# Patient Record
Sex: MALE | Race: WHITE | Employment: FULL TIME | URBAN - METROPOLITAN AREA
[De-identification: names, ages, dates, MRNs, and addresses within clinical notes are randomized per-mention and may not be internally consistent; named-entity substitution may affect disease eponyms.]

---

## 2017-02-15 ENCOUNTER — CLINICAL SUPPORT (OUTPATIENT)
Dept: INTERNAL MEDICINE CLINIC | Age: 57
End: 2017-02-15

## 2017-02-15 DIAGNOSIS — Z11.1 PPD SCREENING TEST: Primary | ICD-10-CM

## 2017-02-15 NOTE — PROGRESS NOTES
Per Dr. Mindy Chu, patient presented in office today for TB injection given in left forearm. Patient tolerated well and understands he is to return to office for test to be read within 48 to 72 hours.

## 2017-02-17 ENCOUNTER — CLINICAL SUPPORT (OUTPATIENT)
Dept: INTERNAL MEDICINE CLINIC | Age: 57
End: 2017-02-17

## 2017-02-17 DIAGNOSIS — Z11.1 PPD SCREENING TEST: Primary | ICD-10-CM

## 2017-02-17 LAB
MM INDURATION POC: 0 MM (ref 0–5)
PPD POC: NORMAL NEGATIVE

## 2017-02-20 NOTE — PROGRESS NOTES
Patient presented in the office today, 2/17/17 to have his PPD read in his left forearm. Results 0mm. Patient left office ambulatory.

## 2017-03-20 ENCOUNTER — OFFICE VISIT (OUTPATIENT)
Dept: INTERNAL MEDICINE CLINIC | Age: 57
End: 2017-03-20

## 2017-03-20 VITALS
HEIGHT: 69 IN | TEMPERATURE: 98.8 F | DIASTOLIC BLOOD PRESSURE: 80 MMHG | SYSTOLIC BLOOD PRESSURE: 150 MMHG | HEART RATE: 72 BPM | OXYGEN SATURATION: 98 % | WEIGHT: 200 LBS | RESPIRATION RATE: 16 BRPM | BODY MASS INDEX: 29.62 KG/M2

## 2017-03-20 DIAGNOSIS — S82.891A FRACTURE OF RIGHT ANKLE, CLOSED, INITIAL ENCOUNTER: Primary | ICD-10-CM

## 2017-03-20 RX ORDER — HYDROCODONE BITARTRATE AND ACETAMINOPHEN 5; 325 MG/1; MG/1
TABLET ORAL
Refills: 0 | COMMUNITY
Start: 2017-03-17 | End: 2017-04-20 | Stop reason: SDUPTHER

## 2017-03-20 NOTE — PROGRESS NOTES
1. Have you been to the ER, urgent care clinic since your last visit? Hospitalized since your last visit? Yes When: Friday Where: [facility] Reason for visit: ankle injury    2. Have you seen or consulted any other health care providers outside of the [address] [place] [address] since your last visit? Include any pap smears or colon screening.  No

## 2017-03-20 NOTE — MR AVS SNAPSHOT
Visit Information Date & Time Provider Department Dept. Phone Encounter #  
 3/20/2017  4:15 PM Valeria Araya MD [facility] INTERNAL MEDICINE OF Sameer Jansen [phone] [id number] Your Appointments 12/27/2017  8:00 AM  
PROCEDURE with [facility] NONIMAGING  
[facility] ([facility]LAY[facility] SCHEDULING) Appt Note: leg art 1yr knaak [address] [address]  
[phone] [address] [facility] [place] [address]  
  
    
 12/27/2017  9:00 AM  
PROCEDURE with [facility] IMAGING 2  
[facility] ([facility] SCHEDULING) Appt Note: porfirio iliac stents 1yr kn[place] [address] [address]  
[phone] [address] [facility] [address]  
  
    
 12/27/2017 10:00 AM  
PROCEDURE with [facility] IMAGING 2  
[facility] ([facility] SCHEDULING) Appt Note: cv 1yr kn[place] [address] [address]  
[phone]  
  
    
 1/11/2018  9:00 AM  
Follow Up with SAMI Mason  
[facility] ([facility]LAYNE SCHEDULING) Appt Note: follow up after studies [address] [facility] [address] [address]  
[phone]  
  
   
 [address] [facility] [address] [address] Upcoming Health Maintenance Date Due Hepatitis C Screening 1960 Pneumococcal 19-64 Medium Risk (1 of 1 - PPSV23) 7/1/1977 FOBT Q 1 YEAR AGE 50-75 7/1/2008 DTaP/Tdap/Td series (1 - Tdap) 8/30/2008 INFLUENZA AGE 9 TO ADULT 8/1/2016 Allergies as of 3/20/2017  Review Complete On: 3/20/2017 By: Ruby Murphy LPN No Known Allergies Current Immunizations  Never Reviewed Name Date  
 TB Skin Test (PPD) Intradermal 2/15/2017 Td 8/29/2008 Not reviewed this visit Vitals BP Pulse Temp Resp Height(growth percentile) 150/80 (BP 1 Location: Right arm, BP Patient Position: Sitting) 72 98.8 °F (37.1 °C) (Tympanic) 16 5' 9\" (1.753 m) Weight(growth percentile) SpO2 BMI Smoking Status 200 lb (90.7 kg) 98% 29.53 kg/m2 Current Every Day Smoker Vitals History BMI and BSA Data Body Mass Index Body Surface Area  
 29.53 kg/m 2 2.1 m 2 Preferred Pharmacy Pharmacy Name Phone [address] [phone] Your Updated Medication List  
  
   
This list is accurate as of: 3/20/17  5:06 PM.  Always use your most recent med list. amLODIPine 5 mg tablet Commonly known as:  Angie Pace TAKE 1 TABLET DAILY  
  
 ascorbic acid (vitamin C) 500 mg tablet Commonly known as:  VITAMIN C Take 1,000 mg by mouth. clopidogrel 75 mg Tab Commonly known as:  PLAVIX TAKE 1 TABLET DAILY  
  
 * HYDROcodone-acetaminophen 7.5-325 mg per tablet Commonly known as:  NORCO  
1 tablet three times per day as needed for pain  
  
 * HYDROcodone-acetaminophen 5-325 mg per tablet Commonly known as:  Zoila Arts  
take 1 tablet by mouth every 4 hours AS NEEDED for pain  
  
 labetalol 200 mg tablet Commonly known as:  Drusilla Starring Take 1 Tab by mouth two (2) times a day. LIVALO 2 mg tablet Generic drug:  pitavastatin Take 2 mg by mouth daily. * Notice: This list has 2 medication(s) that are the same as other medications prescribed for you. Read the directions carefully, and ask your doctor or other care provider to review them with you. Patient Instructions Health Maintenance Due Topic Date Due  
 Hepatitis C Test  1960  Pneumococcal Vaccine (1 of 1 - PPSV23) 07/01/1977  Stool testing for trace blood  07/01/2008  DTaP/Tdap/Td  (1 - Tdap) 08/30/2008  Flu Vaccine  08/01/2016 Introducing [facility]! Tereza Otto introduces [employer] patient portal. Now you can access parts of your medical record, email your doctor's office, and request medication refills online.    
 
1. In your internet browser, go to [email or web address] 2. Click on the First Time User? Click Here link in the Sign In box. You will see the New Member Sign Up page. 3. Enter your [employer] Access Code exactly as it appears below. You will not need to use this code after youve completed the sign-up process. If you do not sign up before the expiration date, you must request a new code. · [employer] Access Code: [email or web address] Expires: 6/18/2017  5:06 PM 
 
4. Enter the last four digits of your Social Security Number (xxxx) and Date of Birth (mm/dd/yyyy) as indicated and click Submit. You will be taken to the next sign-up page. 5. Create a Areshayt ID. This will be your [employer] login ID and cannot be changed, so think of one that is secure and easy to remember. 6. Create a [employer] password. You can change your password at any time. 7. Enter your Password Reset Question and Answer. This can be used at a later time if you forget your password. 8. Enter your e-mail address. You will receive e-mail notification when new information is available in [address] 9. Click Sign Up. You can now view and download portions of your medical record. 10. Click the Download Summary menu link to download a portable copy of your medical information. If you have questions, please visit the Frequently Asked Questions section of the [employer] website. Remember, [employer] is NOT to be used for urgent needs. For medical emergencies, dial 911. Now available from your iPhone and Android! Please provide this summary of care documentation to your next provider. Your primary care clinician is listed as Sadia Barnes. If you have any questions after today's visit, please call [phone].

## 2017-03-21 NOTE — PROGRESS NOTES
The patient presents to the office today with the chief complaint of right ankle pain    HPI    The patient twisted his right ankle at home four days ago. Originally diagnosed as a sprained ankle when a review of the xrays revealing a minimally displaced distal fibular fracture. The patient is wearing a light splint. The patient complains of continued pain of his ankle with continued swelling of his right foot      Review of Systems   Musculoskeletal: Positive for joint pain (Right ankle pain). No Known Allergies    Current Outpatient Prescriptions   Medication Sig Dispense Refill    HYDROcodone-acetaminophen (NORCO) 5-325 mg per tablet take 1 tablet by mouth every 4 hours AS NEEDED for pain  0    amLODIPine (NORVASC) 5 mg tablet TAKE 1 TABLET DAILY 90 Tab 2    clopidogrel (PLAVIX) 75 mg tablet TAKE 1 TABLET DAILY 90 Tab 2    labetalol (NORMODYNE) 200 mg tablet Take 1 Tab by mouth two (2) times a day. 180 Tab 3    ascorbic acid, vitamin C, (VITAMIN C) 500 mg tablet Take 1,000 mg by mouth.  pitavastatin (LIVALO) 2 mg tablet Take 2 mg by mouth daily. Past Medical History:   Diagnosis Date    [facility] peripheral vascular [facility]     Hypertension     Peripheral vascular disease ([facility]     Pure hypercholesterolemia        Past Surgical History:   Procedure Laterality Date    HX ANGIOPLASTY Left 12/7/11    left leg    HX HEART CATHETERIZATION      HX KNEE ARTHROSCOPY  2/2008    HX OTHER SURGICAL  1982    jaw surgery       Social History     Social History    Marital status:      Spouse name: N/A    Number of children: N/A    Years of education: N/A     Occupational History    Not on file.      Social History Main Topics    Smoking status: Current Every Day Smoker     Packs/day: 2.00     Years: 20.00    Smokeless tobacco: Never Used    Alcohol use 10.5 oz/week     21 Cans of beer per week    Drug use: No    Sexual activity: No     Other Topics Concern    Not on file     Social History Narrative       Patient does not have an advanced directive on file    Visit Vitals    /80 (BP 1 Location: Right arm, BP Patient Position: Sitting)    Pulse 72    Temp 98.8 °F (37.1 °C) (Tympanic)    Resp 16    Ht 5' 9\" (1.753 m)    Wt 200 lb (90.7 kg)    SpO2 98%    BMI 29.53 kg/m2       Physical Exam   Musculoskeletal:        Feet:        Clinical Support on 02/15/2017   Component Date Value Ref Range Status    PPD 02/17/2017 neg  Negative Final    mm Induration 02/17/2017 0  mm Final       .No results found for any visits on 03/20/17. Assessment / Plan      ICD-10-CM ICD-9-CM    1. Fracture of right ankle, closed, initial encounter S82.891A 824.8      To podiatry    Follow-up Disposition:  Return in about 6 weeks (around 5/1/2017). I asked Ping Singh if he has any questions and I answered the questions. Ping Singh states that he understands the treatment plan and agrees with the treatment plan

## 2017-04-20 ENCOUNTER — OFFICE VISIT (OUTPATIENT)
Dept: INTERNAL MEDICINE CLINIC | Age: 57
End: 2017-04-20

## 2017-04-20 VITALS
SYSTOLIC BLOOD PRESSURE: 160 MMHG | OXYGEN SATURATION: 98 % | DIASTOLIC BLOOD PRESSURE: 82 MMHG | BODY MASS INDEX: 29.62 KG/M2 | RESPIRATION RATE: 16 BRPM | HEART RATE: 65 BPM | TEMPERATURE: 99 F | HEIGHT: 69 IN | WEIGHT: 200 LBS

## 2017-04-20 DIAGNOSIS — S82.891D FRACTURE OF RIGHT ANKLE, CLOSED, WITH ROUTINE HEALING, SUBSEQUENT ENCOUNTER: ICD-10-CM

## 2017-04-20 DIAGNOSIS — M54.2 NECK PAIN: Primary | ICD-10-CM

## 2017-04-20 RX ORDER — HYDROCODONE BITARTRATE AND ACETAMINOPHEN 5; 325 MG/1; MG/1
TABLET ORAL
Qty: 90 TAB | Refills: 0 | Status: SHIPPED | OUTPATIENT
Start: 2017-04-20 | End: 2018-11-13 | Stop reason: ALTCHOICE

## 2017-04-20 RX ORDER — CYCLOBENZAPRINE HCL 10 MG
TABLET ORAL
Qty: 30 TAB | Refills: 0 | Status: SHIPPED | OUTPATIENT
Start: 2017-04-20 | End: 2017-06-09 | Stop reason: SDUPTHER

## 2017-04-20 NOTE — PROGRESS NOTES
1. Have you been to the ER, urgent care clinic since your last visit? Hospitalized since your last visit? No    2. Have you seen or consulted any other health care providers outside of the [address] [place] [address] since your last visit? Include any pap smears or colon screening.  No

## 2017-04-20 NOTE — PATIENT INSTRUCTIONS
Neck Pain: Care Instructions  Your Care Instructions  You can have neck pain anywhere from the bottom of your head to the top of your shoulders. It can spread to the upper back or arms. Injuries, painting a ceiling, sleeping with your neck twisted, staying in one position for too long, and many other activities can cause neck pain. Most neck pain gets better with home care. Your doctor may recommend medicine to relieve pain or relax your muscles. He or she may suggest exercise and physical therapy to increase flexibility and relieve stress. You may need to wear a special (cervical) collar to support your neck for a day or two. Follow-up care is a key part of your treatment and safety. Be sure to make and go to all appointments, and call your doctor if you are having problems. It's also a good idea to know your test results and keep a list of the medicines you take. How can you care for yourself at home? · Try using a heating pad on a low or medium setting for 15 to 20 minutes every 2 or 3 hours. Try a warm shower in place of one session with the heating pad. · You can also try an ice pack for 10 to 15 minutes every 2 to 3 hours. Put a thin cloth between the ice and your skin. · Take pain medicines exactly as directed. ¨ If the doctor gave you a prescription medicine for pain, take it as prescribed. ¨ If you are not taking a prescription pain medicine, ask your doctor if you can take an over-the-counter medicine. · If your doctor recommends a cervical collar, wear it exactly as directed. When should you call for help? Call your doctor now or seek immediate medical care if:  · You have new or worsening numbness in your arms, buttocks or legs. · You have new or worsening weakness in your arms or legs. (This could make it hard to stand up.)  · You lose control of your bladder or bowels.   Watch closely for changes in your health, and be sure to contact your doctor if:  · Your neck pain is getting worse.  · You are not getting better after 1 week. · You do not get better as expected. Where can you learn more? Go to http://ubaldo-carlos.info/. Enter [id number] in the search box to learn more about \"Neck Pain: Care Instructions. \"  Current as of: May 23, 2016  Content Version: 11.2  © 9819-4330 [employer]. Care instructions adapted under license by [employer] (which disclaims liability or warranty for this information). If you have questions about a medical condition or this instruction, always ask your healthcare professional. [place] [address] any warranty or liability for your use of this information.

## 2017-04-21 NOTE — PROGRESS NOTES
The patient presents to the office today with the chief complaint of neck pain    HPI    The patient complains of bothersome pain and stiffness in his neck. The problem is now more pronounced as the patient is more active as his fractured distal right fibula is healing. The patient has a fractured right distal fibula which is booted and healing      Review of Systems   Respiratory: Negative for shortness of breath. Cardiovascular: Negative for chest pain and leg swelling. No Known Allergies    Current Outpatient Prescriptions   Medication Sig Dispense Refill    cyclobenzaprine (FLEXERIL) 10 mg tablet 1/2 tablet twice per day 30 Tab 0    HYDROcodone-acetaminophen (NORCO) 5-325 mg per tablet 1 tablet three times per day as needed for pain 90 Tab 0    amLODIPine (NORVASC) 5 mg tablet TAKE 1 TABLET DAILY 90 Tab 2    clopidogrel (PLAVIX) 75 mg tablet TAKE 1 TABLET DAILY 90 Tab 2    labetalol (NORMODYNE) 200 mg tablet Take 1 Tab by mouth two (2) times a day. 180 Tab 3    ascorbic acid, vitamin C, (VITAMIN C) 500 mg tablet Take 1,000 mg by mouth.  pitavastatin (LIVALO) 2 mg tablet Take 2 mg by mouth daily. Past Medical History:   Diagnosis Date    Claudication in peripheral vascular disease ([address]     Hypertension     Peripheral vascular disease ([facility] [address]     Pure hypercholesterolemia        Past Surgical History:   Procedure Laterality Date    HX ANGIOPLASTY Left 12/7/11    left leg    HX HEART CATHETERIZATION      HX KNEE ARTHROSCOPY  2/2008    HX OTHER SURGICAL  1982    jaw surgery       Social History     Social History    Marital status:      Spouse name: N/A    Number of children: N/A    Years of education: N/A     Occupational History    Not on file. Social History Main Topics    Smoking status: Current Every Day Smoker     Packs/day: 2.00     Years: 20.00    Smokeless tobacco: Never Used    Alcohol use 10.5 oz/week     21 Cans of beer per week    Drug use:  No  Sexual activity: No     Other Topics Concern    Not on file     Social History Narrative       Patient does not have an advanced directive on file    Visit Vitals    /82 (BP 1 Location: Right arm, BP Patient Position: Sitting)    Pulse 65    Temp 99 °F (37.2 °C) (Tympanic)    Resp 16    Ht 5' 9\" (1.753 m)    Wt 200 lb (90.7 kg)    SpO2 98%    BMI 29.53 kg/m2       Physical Exam   No Cervical Lymphadenopathy  No Supraclavicular Lymphadenopathy  Thyroid is Normal  Lungs are clear to ausculation and percussion  Heart:  S1 S2 are normal, No gallops, No mummers  No Carotid Bruits  Abdomen:  Normal Bowel Sounds. No tenderness. No masses. No Hepatomegaly or Splenomegly  LE:  Strong Pedal Pulses. No Edema  Neck with pain on the left side on extremes of rotation    BMI:  I have reviewed/discussed the above normal BMI with the patient. I have recommended the following interventions: dietary management education, guidance, and counseling . Andreea Glass Clinical Support on 02/15/2017   Component Date Value Ref Range Status    PPD 02/17/2017 neg  Negative Final    mm Induration 02/17/2017 0  mm Final       .No results found for any visits on 04/20/17. Assessment / Plan      ICD-10-CM ICD-9-CM    1. Neck pain M54.2 723.1 XR SPINE CERV 4 OR 5 V   2. Fracture of right ankle, closed, with routine healing, subsequent encounter S82.891D V54.19      Continue with boot and crutches  Add Flexeril  he was advised to continue his maintenance medications  The Prescription Monitoring Program registry was checked by me prior to the issuing of this prescription for a controlled substance  Xray if symptoms persist    Follow-up Disposition:  Return in about 4 months (around 8/20/2017). I asked Jeferson Singh if he has any questions and I answered the questions. Jeferson Singh states that he understands the treatment plan and agrees with the treatment plan

## 2017-06-09 RX ORDER — CYCLOBENZAPRINE HCL 10 MG
TABLET ORAL
Qty: 30 TAB | Refills: 1 | Status: SHIPPED | OUTPATIENT
Start: 2017-06-09 | End: 2018-11-13 | Stop reason: ALTCHOICE

## 2017-08-17 RX ORDER — CLOPIDOGREL BISULFATE 75 MG/1
TABLET ORAL
Qty: 90 TAB | Refills: 1 | Status: SHIPPED | OUTPATIENT
Start: 2017-08-17 | End: 2018-02-13 | Stop reason: SDUPTHER

## 2017-08-17 RX ORDER — AMLODIPINE BESYLATE 5 MG/1
TABLET ORAL
Qty: 90 TAB | Refills: 1 | Status: SHIPPED | OUTPATIENT
Start: 2017-08-17 | End: 2018-02-13 | Stop reason: SDUPTHER

## 2017-11-15 RX ORDER — LABETALOL 200 MG/1
TABLET, FILM COATED ORAL
Qty: 180 TAB | Refills: 3 | Status: SHIPPED | OUTPATIENT
Start: 2017-11-15 | End: 2018-11-12 | Stop reason: SDUPTHER

## 2017-12-27 ENCOUNTER — OFFICE VISIT (OUTPATIENT)
Dept: VASCULAR SURGERY | Age: 57
End: 2017-12-27

## 2017-12-27 DIAGNOSIS — Z95.828 S/P INSERTION OF ILIAC ARTERY STENT: ICD-10-CM

## 2017-12-27 DIAGNOSIS — I65.23 CAROTID STENOSIS, ASYMPTOMATIC, BILATERAL: ICD-10-CM

## 2017-12-27 DIAGNOSIS — I73.9 PVD (PERIPHERAL VASCULAR DISEASE) (HCC): Primary | ICD-10-CM

## 2017-12-27 DIAGNOSIS — I73.9 PAD (PERIPHERAL ARTERY DISEASE) (HCC): ICD-10-CM

## 2017-12-27 DIAGNOSIS — I77.1 ILIAC ARTERY STENOSIS, BILATERAL (HCC): ICD-10-CM

## 2017-12-27 NOTE — PROCEDURES
[facility]   *** FINAL REPORT ***    Name: Teodora Kearns  MRN: [id number]       Outpatient  : 1960  HIS Order #: [id number]  Dimitrios Visit #: [id number]  Date: 27 Dec 2017    TYPE OF TEST: Peripheral Arterial Testing    REASON FOR TEST  Peripheral vascular dz NOS    Right Leg  Segmentals: Normal                     mmHg  Brachial         126  High thigh  Low thigh  Calf             143  Posterior tibial 130  Dorsalis pedis   123  Peroneal  Metatarsal  Toe pressure      89  Doppler:    Normal  Ankle/Brachial: 1.00    Left Leg  Segmentals: Normal                     mmHg  Brachial         130  High thigh  Low thigh  Calf             137  Posterior tibial 125  Dorsalis pedis   125  Peroneal  Metatarsal  Toe pressure      84  Doppler:    Normal  Ankle/Brachial: 0.96  Post exercise results:  Speed: 1.5  mph  Grade: 12  %  Duration: 5.0 MINS     Brachial  Right Ankle  DAMIAN    Left Ankle  DAMIAN    1:   147         128     0.87       135     0.92  2:   132         137     1.04       138     1.05  3:  4:  5:    INTERPRETATION/FINDINGS  Physiologic testing was performed using continuous wave doppler and  segmental pressures. 1. No evidence of significant peripheral arterial disease at rest in  the right leg. 2. No evidence of significant peripheral arterial disease at rest in  the left leg. 3. The right ankle/brachial index is 1.00 and the left ankle/brachial  index is 0.96.  4. The DBI on the right is 0.68 and on the left is 0.65.  5. Completed treadmill exercise showed no evidence of significant  arterial inflow disease bilaterally. No significant drop in DAMIAN post  exercise bilaterally. Essentially no significant changes as compared with previous study. ADDITIONAL COMMENTS    I have personally reviewed the data relevant to the interpretation of  this  study. TECHNOLOGIST: Myra Taylor RVT, BERNADINE  Signed: 2017 10:51 AM    PHYSICIAN: Jarvis Kumar MD  Signed: 2017 01:51 PM

## 2017-12-27 NOTE — PROCEDURES
[facility]   *** FINAL REPORT ***    Name: Mylene Cr  MRN: [id number]       Outpatient  : 1960  HIS Order #: [id number]  [address] [facility] Visit #: [id number]  Date: 27 Dec 2017    TYPE OF TEST: Aorto-Iliac Duplex    REASON FOR TEST  Peripheral Arterial Disease, Iliac stent    B-Mode:-                 (cm)   1     2     3  Aortic diameter:         AP:                           TV:  Common iliac diameter:   Right:                           Left:    Duplex:-                           PSV  Stenosis                           ----- --------------------  Aorta: (1)                     Normal         (2)         (3)                60.0    Right common iliac:      107.0  Right external iliac:    181.0    Left common iliac:       155.0  Left external iliac:     187.0    INTERPRETATION/FINDINGS  Duplex images were obtained using 2-D gray scale, color flow and  spectral doppler analysis. Techinically difficult due to overlying  bowel gas. 1. Bilateral common iliac arteries patent in the segments accessible  to scan. Mulitphasic signals noted. 2. Bilateral external iliac arteries patent without significant  stenosis. Multiphasic signals noted throughout. 3. Iliac stents not clearly visible due to overlying bowel gas. 4. ABIs suggest normal perfusion bilaterally at rest.  The DAMIAN on the  right is 1.00 and on the left is 0.96. Essentially no significant changes as compared with previous study. ADDITIONAL COMMENTS  Term Ao:  60 cm/sec  RCIA:  Prx 107 cm/sec    Prx/Mid 105 cm/sec    Dst not accessed, gas        REIA:  Prx 134 cm/sec     Mid 154 cm/sec     Dst 181 cm/sec  LCIA:  Prx 108 cm/sec    Mid not accessed, gas     Dst 155 cm/sec       KAYLIN:  Prx 187 cm/sec    Mid 176 cm/sec    Dst 169 cm/sec    I have personally reviewed the data relevant to the interpretation of  this  study. TECHNOLOGIST: Stephanie Taylor RVT, BERNADINE  Signed: 2017 11:51 AM    PHYSICIAN: Shelley Santiago MD  Signed: 2017 01:52 PM

## 2017-12-27 NOTE — PROCEDURES
[facility]   *** FINAL REPORT ***    Name: Sunil Tian  MRN: [id number]       Outpatient  : 1960  HIS Order #: [id number]  [id number] [facility] Visit #: [id number]  Date: 27 Dec 2017    TYPE OF TEST: Cerebrovascular Duplex    REASON FOR TEST  Carotid disease    Right Carotid:-             Proximal               Mid                 Distal  cm/s  Systolic  Diastolic  Systolic  Diastolic  Systolic  Diastolic  CCA:     21.0      29.0                            71.0      21.0  Bulb:    73.0      19.0  ECA:    112.0      25.0  ICA:     67.0      24.0       85.0      36.0       85.0      38.0  ICA/CCA:  0.9       1.2    ICA Stenosis: <50%    Right Vertebral:-  Finding: Antegrade  Sys:       32.0  Victoria:       12.0    Right Subclavian:    Left Carotid:-            Proximal                Mid                 Distal  cm/s  Systolic  Diastolic  Systolic  Diastolic  Systolic  Diastolic  CCA:     08.8      23.0                            72.0      23.0  Bulb:    79.0      32.0  ECA:    118.0      23.0  ICA:     85.0      30.0       88.0      38.0       80.0      29.0  ICA/CCA:  1.1       1.7    ICA Stenosis: <50%    Left Vertebral:-  Finding: Antegrade  Sys:       51.0  Victoria:       20.0    Left Subclavian:    INTERPRETATION/FINDINGS  Duplex images were obtained using 2-D gray scale, color flow and  spectral doppler analysis. 1. Mild plaquing of the internal carotid arteries bilaterally in the  range of less than 50%  without evidence of a hemodynamically  significant stenosis. 2. No significant stenosis in the external carotid arteries  bilaterally. 3. Antegrade flow in both vertebral arteries. Plaque Morphology:  1. Heterogeneous plaque in the bulb and right ICA. 2. Heterogeneous plaque in the bulb and left ICA. Prior study unavailable for comparison. ADDITIONAL COMMENTS    I have personally reviewed the data relevant to the interpretation of  this  study. TECHNOLOGIST: Galo Taylor RVT, BS  Signed: 2017 11:36 AM    PHYSICIAN: Aurelia Kumari MD  Signed: 12/27/2017 01:50 PM

## 2018-01-11 ENCOUNTER — OFFICE VISIT (OUTPATIENT)
Dept: VASCULAR SURGERY | Age: 58
End: 2018-01-11

## 2018-01-11 VITALS
SYSTOLIC BLOOD PRESSURE: 124 MMHG | HEIGHT: 69 IN | WEIGHT: 200 LBS | HEART RATE: 76 BPM | BODY MASS INDEX: 29.62 KG/M2 | DIASTOLIC BLOOD PRESSURE: 78 MMHG

## 2018-01-11 DIAGNOSIS — I65.23 CAROTID STENOSIS, ASYMPTOMATIC, BILATERAL: ICD-10-CM

## 2018-01-11 DIAGNOSIS — I77.1 ILIAC ARTERY STENOSIS, BILATERAL (HCC): ICD-10-CM

## 2018-01-11 DIAGNOSIS — I70.213 ATHEROSCLEROSIS OF NATIVE ARTERY OF BOTH LOWER EXTREMITIES WITH INTERMITTENT CLAUDICATION (HCC): Primary | ICD-10-CM

## 2018-01-11 RX ORDER — BISMUTH SUBSALICYLATE 262 MG
1 TABLET,CHEWABLE ORAL DAILY
COMMUNITY
End: 2021-01-01

## 2018-01-11 NOTE — MR AVS SNAPSHOT
Visit Information Date & Time Provider Department Dept. Phone Encounter #  
 1/11/2018  9:00 AM Shane Beaulieu, [address] [facility] [phone] [id number] Follow-up Instructions Return in about 2 years (around 1/11/2020). Your Appointments 1/13/2020 10:00 AM  
PROCEDURE with [facility] IMAGING 1 [facility] ([address]) Appt Note: cv knaak 2 years [address] [facility] [address] [facility] [address]  
[phone] [address] [facility]  
  
    
 1/13/2020 11:00 AM  
PROCEDURE with [facility] NONIMAGING [facility] ([facility]) Appt Note: jacqueline treadmill knaak 2 years [address] [facility] [address] [facility] [address]  
[phone] 23[address] [facility] [address] [facility] [address] Upcoming Health Maintenance Date Due Hepatitis C Screening 1960 Pneumococcal 19-64 Medium Risk (1 of 1 - PPSV23) 7/1/1977 FOBT Q 1 YEAR AGE 50-75 7/1/2008 DTaP/Tdap/Td series (1 - Tdap) 8/30/2008 Influenza Age 5 to Adult 8/1/2017 Allergies as of 1/11/2018  Review Complete On: 1/11/2018 By: Jessica Alva LPN No Known Allergies Current Immunizations  Never Reviewed Name Date  
 TB Skin Test (PPD) Intradermal 2/15/2017 Td 8/29/2008 Not reviewed this visit You Were Diagnosed With   
  
 Codes Comments Atherosclerosis of native artery of both lower extremities with intermittent claudication ([facility] [address]    -  Primary ICD-10-CM: D66.465 ICD-9-CM: 440.21 Iliac artery stenosis, bilateral (HCC)     ICD-10-CM: I77.1 ICD-9-CM: 447.1 Carotid stenosis, asymptomatic, bilateral     ICD-10-CM: I65.23 ICD-9-CM: 433.10, 433.30 Vitals BP Pulse Height(growth percentile) Weight(growth percentile) BMI Smoking Status  124/78 (BP 1 Location: Left arm, BP Patient Position: Sitting) 76 5' 9\" (1.753 m) 200 lb (90.7 kg) 29.53 kg/m2 Current Every Day Smoker BMI and BSA Data Body Mass Index Body Surface Area  
 29.53 kg/m 2 2.1 m 2 Preferred Pharmacy Pharmacy Name Phone [address] [phone] Your Updated Medication List  
  
   
This list is accurate as of: 1/11/18  9:12 AM.  Always use your most recent med list. amLODIPine 5 mg tablet Commonly known as:  Deborha Cords TAKE 1 TABLET DAILY  
  
 ascorbic acid (vitamin C) 500 mg tablet Commonly known as:  VITAMIN C Take 1,000 mg by mouth. clopidogrel 75 mg Tab Commonly known as:  PLAVIX TAKE 1 TABLET DAILY  
  
 cyclobenzaprine 10 mg tablet Commonly known as:  FLEXERIL  
TAKE ONE-HALF (1/2) TABLET TWICE A DAY HYDROcodone-acetaminophen 5-325 mg per tablet Commonly known as:  NORCO  
1 tablet three times per day as needed for pain  
  
 labetalol 200 mg tablet Commonly known as:  NORMODYNE  
TAKE 1 TABLET TWICE A DAY  
  
 LIVALO 2 mg tablet Generic drug:  pitavastatin calcium Take 2 mg by mouth daily. multivitamin tablet Commonly known as:  ONE A DAY Take 1 Tab by mouth daily. Follow-up Instructions Return in about 2 years (around 1/11/2020). To-Do List   
 01/11/2020 Imaging:  DUPLEX CAROTID BILATERAL AMB   
  
 01/11/2020 Imaging:  LOWER EXT ART PVR W EXERC BILAT (TREADMILL/WALKING) AMB Introducing \[facility] [place] [facility]\"" & HEALTH SERVICES! [employer] introduces [employer] patient portal. Now you can access parts of your medical record, email your doctor's office, and request medication refills online. 1. In your internet browser, go to [email or web address] 2. Click on the First Time User? Click Here link in the Sign In box. You will see the New Member Sign Up page. 3. Enter your [employer] Access Code exactly as it appears below.  You will not need to use this code after youve completed the sign-up process. If you do not sign up before the expiration date, you must request a new code. · [employer] Access Code: [email or web address] Expires: 3/27/2018  8:15 AM 
 
4. Enter the last four digits of your Social Security Number (xxxx) and Date of Birth (mm/dd/yyyy) as indicated and click Submit. You will be taken to the next sign-up page. 5. Create a [employer] ID. This will be your [employer] login ID and cannot be changed, so think of one that is secure and easy to remember. 6. Create a [employer] password. You can change your password at any time. 7. Enter your Password Reset Question and Answer. This can be used at a later time if you forget your password. 8. Enter your e-mail address. You will receive e-mail notification when new information is available in [address] 9. Click Sign Up. You can now view and download portions of your medical record. 10. Click the Download Summary menu link to download a portable copy of your medical information. If you have questions, please visit the Frequently Asked Questions section of the [employer] website. Remember, [employer] is NOT to be used for urgent needs. For medical emergencies, dial 911. Now available from your iPhone and Android! Please provide this summary of care documentation to your next provider. Your primary care clinician is listed as [employer]. If you have any questions after today's visit, please call [phone].

## 2018-01-11 NOTE — PROGRESS NOTES
Rodolfo Singh    Chief Complaint   Patient presents with    Carotid Artery Stenosis       History and Physical    Mr Keila Wagoner is here now 5 years out from a repeat iliac artery intervention for symptomatic claudication in 2012. We did in fact try to back track to find out when his original intervention was done, but could not determine that     But he had the stents originally placed and then developed recurrent stenosis with symptoms and had this last intervention in 2012 with good results. He has had no recurrent claudication symptoms. He feels he is well aware of the symptoms to call us if anything changes     Also, in spite of our previous discussions, he does continue to smoke. He has tried prescriptive and OTC options without results    But health is otherwise stable  He did have ankle fracture last year but recovered nicely from that    Though he smokes he is on good medical therapy for PAD    Past Medical History:   Diagnosis Date    Claudication in peripheral vascular disease ([address]     Hypertension     Peripheral vascular disease ([facility] [address]     Pure hypercholesterolemia      Patient Active Problem List   Diagnosis Code    Pure hypercholesterolemia E78.00    Fracture of rib of left side S22.32XA     Past Surgical History:   Procedure Laterality Date    HX ANGIOPLASTY Left 12/7/11    left leg    HX HEART CATHETERIZATION      HX KNEE ARTHROSCOPY  2/2008    HX OTHER SURGICAL  1982    jaw surgery     Current Outpatient Prescriptions   Medication Sig Dispense Refill    multivitamin (ONE A DAY) tablet Take 1 Tab by mouth daily.  labetalol (NORMODYNE) 200 mg tablet TAKE 1 TABLET TWICE A  Tab 3    amLODIPine (NORVASC) 5 mg tablet TAKE 1 TABLET DAILY 90 Tab 1    clopidogrel (PLAVIX) 75 mg tab TAKE 1 TABLET DAILY 90 Tab 1    pitavastatin (LIVALO) 2 mg tablet Take 2 mg by mouth daily.         cyclobenzaprine (FLEXERIL) 10 mg tablet TAKE ONE-HALF (1/2) TABLET TWICE A DAY 30 Tab 1    HYDROcodone-acetaminophen (NORCO) 5-325 mg per tablet 1 tablet three times per day as needed for pain 90 Tab 0    ascorbic acid, vitamin C, (VITAMIN C) 500 mg tablet Take 1,000 mg by mouth. No Known Allergies    Review of Systems    Review of Systems - History obtained from the patient  General ROS: negative  Psychological ROS: negative  Ophthalmic ROS: negative  Respiratory ROS: negative  Cardiovascular ROS: negative  Gastrointestinal ROS: negative  Musculoskeletal ROS: negative  Neurological ROS: negative  Dermatological ROS: negative  Vascular ROS: negative     Physical   Visit Vitals    /78 (BP 1 Location: Left arm, BP Patient Position: Sitting)    Pulse 76    Ht 5' 9\" (1.753 m)    Wt 200 lb (90.7 kg)    BMI 29.53 kg/m2     General:  Alert, cooperative, no distress. Head:  Normocephalic, without obvious abnormality, atraumatic. Eyes:    Conjunctivae/corneas clear. Pupils equal, round, reactive to light. Extraocular movements intact. Neck:         No bruits   Lungs:   Clear to auscultation bilaterally. Heart:  Regular rate and rhythm, S1, S2 normal   Extremities: Extremities normal, atraumatic, no cyanosis or edema. Pulses: 1+ and symmetric all extremities. Skin: Skin color, texture, turgor normal. No rashes or lesions. Vascular studies:  1. Bilateral common iliac arteries patent in the segments accessible  to scan. Mulitphasic signals noted. 2. Bilateral external iliac arteries patent without significant  stenosis. Multiphasic signals noted throughout. 3. Iliac stents not clearly visible due to overlying bowel gas. Essentially no significant changes as compared with previous study    1. No evidence of significant peripheral arterial disease at rest in  the right leg. 2. No evidence of significant peripheral arterial disease at rest in  the left leg.   3. The right ankle/brachial index is 1.00 and the left ankle/brachial  index is 0.96.  4. The DBI on the right is 0.68 and on the left is 0.65.  5. Completed treadmill exercise showed no evidence of significant  arterial inflow disease bilaterally. No significant drop in DAMIAN post  exercise bilaterally. Essentially no significant changes as compared with previous study.     1. Mild plaquing of the internal carotid arteries bilaterally in the  range of less than 50%  without evidence of a hemodynamically  significant stenosis. 2. No significant stenosis in the external carotid arteries  bilaterally. 3. Antegrade flow in both vertebral arteries. Plaque Morphology:  1. Heterogeneous plaque in the bulb and right ICA. 2. Heterogeneous plaque in the bulb and left ICA. Prior study unavailable for comparison    Impression/Plan:     ICD-10-CM ICD-9-CM    1. Atherosclerosis of native artery of both lower extremities with intermittent claudication (HCC) I70.213 440.21 LOWER EXT ART PVR W EXERC BILAT (TREADMILL/WALKING) AMB   2. Iliac artery stenosis, bilateral ([facility]) I77.1 447.1 LOWER EXT ART PVR W EXERC BILAT (TREADMILL/WALKING) AMB   3. Carotid stenosis, asymptomatic, bilateral I65.23 433.10 DUPLEX CAROTID BILATERAL AMB     433.30      Orders Placed This Encounter    LOWER EXT ART PVR W EXERC BILAT (TREADMILL/WALKING) AMB    DUPLEX CAROTID BILATERAL AMB    multivitamin (ONE A DAY) tablet     Results and stability of them reviewed. He feels that he is well versed in the claudication symptoms over the years that he will chris if he starts to notice any symptoms! Can transition to follow up every 2 years    Follow-up Disposition:  Return in about 2 years (around 1/11/2020). SAMI Browning    Portions of this note have been entered using voice recognition software.

## 2018-02-13 RX ORDER — CLOPIDOGREL BISULFATE 75 MG/1
TABLET ORAL
Qty: 90 TAB | Refills: 1 | Status: SHIPPED | OUTPATIENT
Start: 2018-02-13 | End: 2018-08-12 | Stop reason: SDUPTHER

## 2018-02-13 RX ORDER — AMLODIPINE BESYLATE 5 MG/1
TABLET ORAL
Qty: 90 TAB | Refills: 1 | Status: SHIPPED | OUTPATIENT
Start: 2018-02-13 | End: 2018-08-12 | Stop reason: SDUPTHER

## 2018-08-12 RX ORDER — CLOPIDOGREL BISULFATE 75 MG/1
TABLET ORAL
Qty: 90 TAB | Refills: 1 | Status: SHIPPED | OUTPATIENT
Start: 2018-08-12 | End: 2019-02-08 | Stop reason: SDUPTHER

## 2018-08-12 RX ORDER — AMLODIPINE BESYLATE 5 MG/1
TABLET ORAL
Qty: 90 TAB | Refills: 1 | Status: SHIPPED | OUTPATIENT
Start: 2018-08-12 | End: 2019-02-08 | Stop reason: SDUPTHER

## 2018-11-12 ENCOUNTER — OFFICE VISIT (OUTPATIENT)
Dept: INTERNAL MEDICINE CLINIC | Age: 58
End: 2018-11-12

## 2018-11-12 VITALS
HEIGHT: 69 IN | BODY MASS INDEX: 29.62 KG/M2 | SYSTOLIC BLOOD PRESSURE: 140 MMHG | DIASTOLIC BLOOD PRESSURE: 70 MMHG | WEIGHT: 200 LBS | OXYGEN SATURATION: 98 % | HEART RATE: 67 BPM | TEMPERATURE: 98.6 F

## 2018-11-12 DIAGNOSIS — Z12.5 PROSTATE CANCER SCREENING: ICD-10-CM

## 2018-11-12 DIAGNOSIS — R55 SYNCOPE, UNSPECIFIED SYNCOPE TYPE: ICD-10-CM

## 2018-11-12 DIAGNOSIS — E78.00 PURE HYPERCHOLESTEROLEMIA: ICD-10-CM

## 2018-11-12 DIAGNOSIS — R05.9 COUGH: Primary | ICD-10-CM

## 2018-11-12 DIAGNOSIS — M79.641 RIGHT HAND PAIN: ICD-10-CM

## 2018-11-12 RX ORDER — LABETALOL 200 MG/1
TABLET, FILM COATED ORAL
Qty: 180 TAB | Refills: 3 | Status: SHIPPED | OUTPATIENT
Start: 2018-11-12 | End: 2019-11-09 | Stop reason: SDUPTHER

## 2018-11-12 NOTE — PROGRESS NOTES
Chief Complaint   Patient presents with    Complete Physical       Depression Screening:  PHQ over the last two weeks 6/30/2016   Little interest or pleasure in doing things Not at all   Feeling down, depressed, irritable, or hopeless Not at all   Total Score PHQ 2 0       Learning Assessment:  Learning Assessment 12/27/2016   PRIMARY LEARNER Patient   CO-LEARNER CAREGIVER -   PRIMARY LANGUAGE ENGLISH   LEARNER PREFERENCE PRIMARY LISTENING   ANSWERED BY patient   RELATIONSHIP SELF           1. Have you been to the ER, urgent care clinic since your last visit? Hospitalized since your last visit? No    2. Have you seen or consulted any other health care providers outside of the [address] [place] [address] since your last visit? Include any pap smears or colon screening.  No

## 2018-11-13 ENCOUNTER — HOSPITAL ENCOUNTER (OUTPATIENT)
Dept: GENERAL RADIOLOGY | Age: 58
Discharge: HOME | End: 2018-11-13
Payer: COMMERCIAL

## 2018-11-13 DIAGNOSIS — R05.9 COUGH: ICD-10-CM

## 2018-11-13 DIAGNOSIS — M79.641 RIGHT HAND PAIN: ICD-10-CM

## 2018-11-13 PROCEDURE — 71046 X-RAY EXAM CHEST 2 VIEWS: CPT

## 2018-11-13 PROCEDURE — 73130 X-RAY EXAM OF HAND: CPT

## 2018-11-14 NOTE — PROGRESS NOTES
The patient presents to the office today with the chief complaint of cough syncope    HPI    The patient continues with cigarette use. He has has had 2 episodes of cough with transient loss of consciousness. He denies any other dizziness. The patient remains on medications for hyperlipidemia. He is tolerating the medications well. The patient continues with cigarette use. Review of Systems   Respiratory: Positive for cough. Negative for shortness of breath. Cardiovascular: Negative for chest pain and leg swelling. No Known Allergies    Current Outpatient Medications   Medication Sig Dispense Refill    labetalol (NORMODYNE) 200 mg tablet TAKE 1 TABLET TWICE A  Tab 3    clopidogrel (PLAVIX) 75 mg tab TAKE 1 TABLET DAILY 90 Tab 1    amLODIPine (NORVASC) 5 mg tablet TAKE 1 TABLET DAILY 90 Tab 1    multivitamin (ONE A DAY) tablet Take 1 Tab by mouth daily.  ascorbic acid, vitamin C, (VITAMIN C) 500 mg tablet Take 1,000 mg by mouth.  pitavastatin (LIVALO) 2 mg tablet Take 2 mg by mouth daily.            Past Medical History:   Diagnosis Date    Claudication in peripheral vascular disease ([facility] [address]     Hypertension     Peripheral vascular disease ([facility] [address]     Pure hypercholesterolemia        Past Surgical History:   Procedure Laterality Date    HX ANGIOPLASTY Left 12/7/11    left leg    HX HEART CATHETERIZATION      HX KNEE ARTHROSCOPY  2/2008    HX OTHER SURGICAL  1982    jaw surgery       Social History     Socioeconomic History    Marital status:      Spouse name: Not on file    Number of children: Not on file    Years of education: Not on file    Highest education level: Not on file   Social Needs    Financial resource strain: Not on file    Food insecurity - worry: Not on file    Food insecurity - inability: Not on file   [employer] needs - medical: Not on file   [employer] needs - non-medical: Not on file   Occupational History    Not on file Tobacco Use    Smoking status: Current Every Day Smoker     Packs/day: 2.00     Years: 20.00     Pack years: 40.00    Smokeless tobacco: Never Used   Substance and Sexual Activity    Alcohol use: Yes     Alcohol/week: 10.5 oz     Types: 21 Cans of beer per week    Drug use: No    Sexual activity: No   Other Topics Concern    Not on file   Social History Narrative    Not on file       Patient does not have an advanced directive on file    Visit Vitals  /70 (BP 1 Location: Left arm, BP Patient Position: Sitting)   Pulse 67   Temp 98.6 °F (37 °C) (Tympanic)   Ht 5' 9\" (1.753 m)   Wt 200 lb (90.7 kg)   SpO2 98%   BMI 29.53 kg/m²       Physical Exam   No Cervical Lymphadenopathy  No Supraclavicular Lymphadenopathy  Thyroid is Normal  Lungs are normal to percussion. Clear to auscultation   Heart:  S1 S2 are normal, No gallops, No murmers  No Carotid Bruits  Abdomen:  Normal Bowel Sounds. No tenderness. No masses. No Hepatomegaly or Splenomegaly  LE:  Strong Pedal Pulses. No Edema      BMI:  OK    No visits with results within 3 Month(s) from this visit. Latest known visit with results is:   Clinical Support on 02/15/2017   Component Date Value Ref Range Status    PPD 02/17/2017 neg  Negative Final    mm Induration 02/17/2017 0  mm Final       .No results found for any visits on 11/12/18. Assessment / Plan      ICD-10-CM ICD-9-CM    1. Cough R05 786.2 XR CHEST PA LAT      CBC WITH AUTOMATED DIFF   2. Right hand pain M79.641 729.5 CBC WITH AUTOMATED DIFF      XR HAND RT MIN 3 V      C REACTIVE PROTEIN, QT      SED RATE (ESR)   3.  Syncope, unspecified syncope type R55 780.2 CBC WITH AUTOMATED DIFF      METABOLIC PANEL, COMPREHENSIVE      AMB POC EKG ROUTINE W/ 12 LEADS, INTER & REP   4. Pure hypercholesterolemia J74.75 286.7 METABOLIC PANEL, COMPREHENSIVE      LIPID PANEL   5. Prostate cancer screening Z12.5 V76.44 PSA SCREENING (SCREENING)       Labs ordered  Chest Xray ordered  he was advised to continue his maintenance medications  The patient was counseled on the dangers of tobacco use, and was advised to quit. Reviewed strategies to maximize success with removing cigarettes from the environment. Follow-up Disposition:  Return in about 4 months (around 3/12/2019). I asked Talat Singh if he has any questions and I answered the questions. Talat Singh states that he understands the treatment plan and agrees with the treatment plan

## 2018-11-16 ENCOUNTER — HOSPITAL ENCOUNTER (OUTPATIENT)
Dept: LAB | Age: 58
Discharge: HOME | End: 2018-11-16
Payer: COMMERCIAL

## 2018-11-16 DIAGNOSIS — M19.041 PRIMARY OSTEOARTHRITIS OF RIGHT HAND: Primary | ICD-10-CM

## 2018-11-16 DIAGNOSIS — E78.00 PURE HYPERCHOLESTEROLEMIA: ICD-10-CM

## 2018-11-16 LAB
ALBUMIN SERPL-MCNC: 4.4 G/DL (ref 3.4–5)
ALBUMIN/GLOB SERPL: 1.6 {RATIO} (ref 0.8–1.7)
ALP SERPL-CCNC: 68 U/L (ref 45–117)
ALT SERPL-CCNC: 40 U/L (ref 16–61)
ANION GAP SERPL CALC-SCNC: 7 MMOL/L (ref 3–18)
AST SERPL-CCNC: 23 U/L (ref 15–37)
BASOPHILS # BLD: 0.1 K/UL (ref 0–0.1)
BASOPHILS NFR BLD: 1 % (ref 0–2)
BILIRUB SERPL-MCNC: 0.4 MG/DL (ref 0.2–1)
BUN SERPL-MCNC: 7 MG/DL (ref 7–18)
BUN/CREAT SERPL: 9 (ref 12–20)
CALCIUM SERPL-MCNC: 9.1 MG/DL (ref 8.5–10.1)
CHLORIDE SERPL-SCNC: 105 MMOL/L (ref 100–108)
CHOLEST SERPL-MCNC: 196 MG/DL
CO2 SERPL-SCNC: 26 MMOL/L (ref 21–32)
CREAT SERPL-MCNC: 0.79 MG/DL (ref 0.6–1.3)
DIFFERENTIAL METHOD BLD: ABNORMAL
EOSINOPHIL # BLD: 0.4 K/UL (ref 0–0.4)
EOSINOPHIL NFR BLD: 6 % (ref 0–5)
ERYTHROCYTE [DISTWIDTH] IN BLOOD BY AUTOMATED COUNT: 13.3 % (ref 11.6–14.5)
GLOBULIN SER CALC-MCNC: 2.7 G/DL (ref 2–4)
GLUCOSE SERPL-MCNC: 108 MG/DL (ref 74–99)
HCT VFR BLD AUTO: 47.7 % (ref 36–48)
HDLC SERPL-MCNC: 57 MG/DL (ref 40–60)
HDLC SERPL: 3.4 {RATIO} (ref 0–5)
HGB BLD-MCNC: 16.2 G/DL (ref 13–16)
LDLC SERPL CALC-MCNC: 122.4 MG/DL (ref 0–100)
LIPID PROFILE,FLP: ABNORMAL
LYMPHOCYTES # BLD: 1.6 K/UL (ref 0.9–3.6)
LYMPHOCYTES NFR BLD: 26 % (ref 21–52)
MCH RBC QN AUTO: 32.6 PG (ref 24–34)
MCHC RBC AUTO-ENTMCNC: 34 G/DL (ref 31–37)
MCV RBC AUTO: 96 FL (ref 74–97)
MONOCYTES # BLD: 1.1 K/UL (ref 0.05–1.2)
MONOCYTES NFR BLD: 17 % (ref 3–10)
NEUTS SEG # BLD: 3.2 K/UL (ref 1.8–8)
NEUTS SEG NFR BLD: 50 % (ref 40–73)
PLATELET # BLD AUTO: 252 K/UL (ref 135–420)
PMV BLD AUTO: 10.8 FL (ref 9.2–11.8)
POTASSIUM SERPL-SCNC: 4.7 MMOL/L (ref 3.5–5.5)
PROT SERPL-MCNC: 7.1 G/DL (ref 6.4–8.2)
RBC # BLD AUTO: 4.97 M/UL (ref 4.7–5.5)
SODIUM SERPL-SCNC: 138 MMOL/L (ref 136–145)
TRIGL SERPL-MCNC: 83 MG/DL (ref ?–150)
VLDLC SERPL CALC-MCNC: 16.6 MG/DL
WBC # BLD AUTO: 6.3 K/UL (ref 4.6–13.2)

## 2018-11-16 PROCEDURE — 36415 COLL VENOUS BLD VENIPUNCTURE: CPT

## 2018-11-16 PROCEDURE — 80061 LIPID PANEL: CPT

## 2018-11-16 PROCEDURE — 85025 COMPLETE CBC W/AUTO DIFF WBC: CPT

## 2018-11-16 PROCEDURE — 80053 COMPREHEN METABOLIC PANEL: CPT

## 2018-11-16 RX ORDER — CEFUROXIME AXETIL 500 MG/1
500 TABLET ORAL 2 TIMES DAILY
Qty: 14 TAB | Refills: 0 | Status: SHIPPED | OUTPATIENT
Start: 2018-11-16 | End: 2019-05-24

## 2018-11-30 ENCOUNTER — OFFICE VISIT (OUTPATIENT)
Dept: ORTHOPEDIC SURGERY | Age: 58
End: 2018-11-30

## 2018-11-30 VITALS
SYSTOLIC BLOOD PRESSURE: 138 MMHG | DIASTOLIC BLOOD PRESSURE: 75 MMHG | HEART RATE: 59 BPM | TEMPERATURE: 98.9 F | HEIGHT: 69 IN | BODY MASS INDEX: 29.62 KG/M2 | WEIGHT: 200 LBS | OXYGEN SATURATION: 96 %

## 2018-11-30 DIAGNOSIS — M19.031 LOCALIZED PRIMARY CARPOMETACARPAL OSTEOARTHRITIS, RIGHT: Primary | ICD-10-CM

## 2018-11-30 RX ORDER — LIDOCAINE HYDROCHLORIDE 10 MG/ML
0.5 INJECTION INFILTRATION; PERINEURAL ONCE
Qty: 0.5 ML | Refills: 0
Start: 2018-11-30 | End: 2018-11-30

## 2018-11-30 NOTE — PATIENT INSTRUCTIONS
Learning About Arthritis at the [facility] of the Thumb What is it? Arthritis at the base of the thumb joint is wear and tear on the cartilage. Cartilage is a firm, thick, slippery tissue. It covers and protects the ends of bones where they meet to form a joint. When you have arthritis, there are changes in the cartilage that cause it to break down. The bones rub together and cause joint damage and pain. What causes it? Experts don't know what causes arthritis at the base of the thumb. But aging, a lot of use, an injury, or family history may play a part. What are the symptoms? Symptoms of arthritis at the base of the thumb include aching in your joint. Or the pain may feel burning or sharp. You may feel clicking, creaking, or catching in the joint. It may get stiff. You may have more pain and less strength when you pinch or  things. Symptoms may come and go, stay the same, or get worse over time. How is it diagnosed? Your doctor can often diagnose arthritis by asking you questions about your joint pain and other symptoms and examining you. You may also have X-rays and blood tests. Blood tests can help make sure another disease isn't causing your symptoms. How is it treated? Arthritis at the base of your thumb may be treated with rest, pain relievers, steroid medicines, using a brace or splint, andin some casessurgery. To help relieve pain in the joint, rest your sore hand. Switch hands for some activities. You can try heat and cold therapy, such as hot compresses, paraffin wax, cold packs, or ice massage. Your doctor may give you a splint to wear during some activities or when pain flares up. You can often manage mild or moderate arthritis pain with over-the-counter pain relievers. These include medicines that reduce swelling, such as ibuprofen or naproxen. You can also use acetaminophen. Sometimes these medicines are in creams that you can rub on your thumb and hand.  Your doctor may also prescribe other medicine for your pain. For some people, steroid shots may be an option. If none of the treatments work, your doctor may discuss surgery with you. Follow-up care is a key part of your treatment and safety. Be sure to make and go to all appointments, and call your doctor if you are having problems. It's also a good idea to know your test results and keep a list of the medicines you take. Where can you learn more? Go to http://ubaldo-carlos.info/. Enter T110 in the search box to learn more about \"Learning About Arthritis at the [facility] of the Thumb. \" Current as of: June 11, 2018 Content Version: 11.8 © 9394-1924 [employer]. Care instructions adapted under license by [employer] (which disclaims liability or warranty for this information). If you have questions about a medical condition or this instruction, always ask your healthcare professional. [address] any warranty or liability for your use of this information.

## 2018-11-30 NOTE — PROGRESS NOTES
Elisabet Hemphill is a 59 y.o. male right handed . Worker's Compensation and legal considerations: none filed. Vitals:  
 11/30/18 1039 BP: 138/75 Pulse: (!) 59 Temp: 98.9 °F (37.2 °C) TempSrc: Oral  
SpO2: 96% Weight: 200 lb (90.7 kg) Height: 5' 9\" (1.753 m) PainSc:   4 PainLoc: Finger Chief Complaint Patient presents with  Finger Pain  
  right 5th digit HPI: Patient comes in today as a referral from his primary care physician for primary osteoarthritis of his right thumb. He reports that this is been going on for several months. He denies any injuries. He reports his pain to be at the base of the thumb. Date of onset: Several months Injury: No 
 
Prior Treatment:  No 
 
Numbness/ Tingling: No 
 
ROS: Review of Systems - General ROS: negative Respiratory ROS: no cough, shortness of breath, or wheezing Cardiovascular ROS: no chest pain or dyspnea on exertion Musculoskeletal ROS: positive for - pain in hand - right Neurological ROS: negative Dermatological ROS: negative Past Medical History:  
Diagnosis Date  Claudication in peripheral vascular disease ([facility] [address]  Hypertension  Peripheral vascular disease ([facility] [address]  Pure hypercholesterolemia Past Surgical History:  
Procedure Laterality Date  HX ANGIOPLASTY Left 12/7/11  
 left leg  HX HEART CATHETERIZATION    
 HX KNEE ARTHROSCOPY  2/2008  HX OTHER SURGICAL  1982  
 jaw surgery Current Outpatient Medications Medication Sig Dispense Refill  cefUROXime (CEFTIN) 500 mg tablet Take 1 Tab by mouth two (2) times a day. 14 Tab 0  clopidogrel (PLAVIX) 75 mg tab TAKE 1 TABLET DAILY 90 Tab 1  
 amLODIPine (NORVASC) 5 mg tablet TAKE 1 TABLET DAILY 90 Tab 1  
 multivitamin (ONE A DAY) tablet Take 1 Tab by mouth daily.  pitavastatin (LIVALO) 2 mg tablet Take 2 mg by mouth daily.  labetalol (NORMODYNE) 200 mg tablet TAKE 1 TABLET TWICE A  Tab 3  
 ascorbic acid, vitamin C, (VITAMIN C) 500 mg tablet Take 1,000 mg by mouth. No Known Allergies PE:  
 
Hand: 
 
Examination L Digit(s) R Digit(s) 1st CMC Tenderness -  + 1st CMC Grind -  + Dougie Nodes -  -   
Heberden Nodes -  -   
A1 Pulley Tenderness -  - Triggering -  -   
UCL Instability -  -   
RCL Instability -  - Lateral Stress Pain -  -   
Palmar Cords -  - Tabletop test -  -   
Garrod's Pads -  -   
 Strength Pinch Strength      
 
ROM: Full Imaging: Plain films of the right thumb CMC joint shows stage I-II osteoarthritic changes. The joint space is largely maintained however there is some narrowing periphery. There is some osteophytic changes. ICD-10-CM ICD-9-CM 1. Localized primary carpometacarpal osteoarthritis, right M19.031 715.14 Plan: Injection to right thumb CMC joint Comfort cool brace to the right thumb. At the end of the visit on the way out the patient mentioned that his left hand was starting to have similar symptoms in the past week. I told him that we would address that at the next visit when we did get x-rays of his left thumb. Follow-up PRN no sooner than 2 months. [address] [facility] [place] PROCEDURE PROGRESS NOTE Chart reviewed for the following: 
 IYemi DO, have reviewed the History, Physical and updated the Allergic reactions for Vivienne Lefort Riffe TIME OUT performed immediately prior to start of procedure: 
 Yemi OWUSU DO, have performed the following reviews on Cedric Singh prior to the start of the procedure: 
         
* Patient was identified by name and date of birth * Agreement on procedure being performed was verified * Risks and Benefits explained to the patient * Procedure site verified and marked as necessary * Patient was positioned for comfort * Consent was signed and verified Time: 09:50 AM 
 
 
Date of procedure: 11/30/2018 Procedure performed by: Kyle Coyle DO 
 
Provider assisted by: Osman Damon LPN Patient assisted by: self How tolerated by patient: tolerated the procedure well with no complications Post Procedural Pain Scale: 0 - No Hurt Comments: none Procedure: After consent was obtained, using sterile technique the joint was prepped. Local anesthetic used: 1% lidocaine. Kenalog 5 mg and was then injected and the needle withdrawn. The procedure was well tolerated. The patient is asked to continue to rest the area for a few more days before resuming regular activities. It may be more painful for the first 1-2 days. Watch for fever, or increased swelling or persistent pain in the joint. Call or return to clinic prn if such symptoms occur or there is failure to improve as anticipated. Plan was reviewed with patient, who verbalized agreement and understanding of the plan

## 2019-02-08 RX ORDER — CLOPIDOGREL BISULFATE 75 MG/1
TABLET ORAL
Qty: 90 TAB | Refills: 1 | Status: SHIPPED | OUTPATIENT
Start: 2019-02-08 | End: 2019-08-07 | Stop reason: SDUPTHER

## 2019-02-08 RX ORDER — AMLODIPINE BESYLATE 5 MG/1
TABLET ORAL
Qty: 90 TAB | Refills: 1 | Status: SHIPPED | OUTPATIENT
Start: 2019-02-08 | End: 2019-08-07 | Stop reason: SDUPTHER

## 2019-05-24 ENCOUNTER — HOSPITAL ENCOUNTER (EMERGENCY)
Age: 59
Discharge: HOME | End: 2019-05-24
Attending: EMERGENCY MEDICINE
Payer: COMMERCIAL

## 2019-05-24 VITALS
OXYGEN SATURATION: 95 % | RESPIRATION RATE: 16 BRPM | HEIGHT: 70 IN | HEART RATE: 84 BPM | DIASTOLIC BLOOD PRESSURE: 84 MMHG | TEMPERATURE: 98.7 F | BODY MASS INDEX: 28.63 KG/M2 | WEIGHT: 200 LBS | SYSTOLIC BLOOD PRESSURE: 129 MMHG
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
## 2019-05-24 DIAGNOSIS — J01.90 ACUTE SINUSITIS, RECURRENCE NOT SPECIFIED, UNSPECIFIED LOCATION: ICD-10-CM

## 2019-05-24 DIAGNOSIS — L03.317 CELLULITIS OF BUTTOCK: ICD-10-CM

## 2019-05-24 PROCEDURE — 99282 EMERGENCY DEPT VISIT SF MDM: CPT

## 2019-05-24 RX ORDER — ACETAMINOPHEN 500 MG
500 TABLET ORAL
Qty: 15 TAB | Refills: 0 | Status: SHIPPED | OUTPATIENT
Start: 2019-05-24 | End: 2019-05-31

## 2019-05-24 RX ORDER — DOXYCYCLINE HYCLATE 100 MG
100 TABLET ORAL 2 TIMES DAILY
Qty: 28 TAB | Refills: 0 | Status: SHIPPED | OUTPATIENT
Start: 2019-05-24 | End: 2019-06-07

## 2019-05-24 NOTE — ED PROVIDER NOTES
EMERGENCY DEPARTMENT HISTORY AND PHYSICAL EXAM 
 
11:31 AM 
 
 
Date: 5/24/2019 Patient Name: Alexis Fields History of Presenting Illness Chief Complaint Patient presents with  Insect Bite History Provided By: Patient Additional History (Context): Alexis Fields is a 59 y.o. male with Past medical history of hypertension and hypercholesterolemia who presents with chief complaint of multiple tick bites about 3 days ago. 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 He noticed that he had been bitten and was able to pull the ticks out. He reports a bite on the right and left posterior thighs and the left buttock. Fever, chills, cough or aches. He did report having some drainage coming from the buttock wound. States that the buttock wound is mild to moderately painful. He also reports having some sinus pressure and nasal congestion for the past few days. He tried some cough medicine thinking it would help with the nasal drainage but that did not help and also denies cough. Also denies headache, dizziness, or sore throat. No other complaints. PCP: Corine Wyatt MD 
 
 
 
Past History Past Medical History: 
Past Medical History:  
Diagnosis Date  Claudication in peripheral vascular disease ([facility] [address]  Hypertension  Peripheral vascular disease ([facility] [address]  Pure hypercholesterolemia Past Surgical History: 
Past Surgical History:  
Procedure Laterality Date  HX ANGIOPLASTY Left 12/7/11  
 left leg  HX HEART CATHETERIZATION    
 HX KNEE ARTHROSCOPY  2/2008  HX OTHER SURGICAL  1982  
 jaw surgery Family History: 
Family History Problem Relation Age of Onset  Hypertension Other  Heart Disease Other  Diabetes Other  Stroke Other  Cancer Father   
     lung Social History: 
Social History Tobacco Use  Smoking status: Current Every Day Smoker   Packs/day: 2.00  
 Years: 20.00 Pack years: 40.00  Smokeless tobacco: Never Used Substance Use Topics  Alcohol use: Yes Alcohol/week: 10.5 oz Types: 21 Cans of beer per week  Drug use: No  
 
 
Allergies: 
No Known Allergies Review of Systems Review of Systems Constitutional: Negative for chills and fever. HENT: Negative for congestion, rhinorrhea, sore throat and trouble swallowing. Eyes: Negative for visual disturbance. Respiratory: Negative for cough, shortness of breath and wheezing. Gastrointestinal: Negative for abdominal pain, nausea and vomiting. Endocrine: Negative for polyuria. Genitourinary: Negative for difficulty urinating and dysuria. Musculoskeletal: Negative for arthralgias and neck stiffness. Skin: Positive for wound. Negative for rash. Multiple tick bites One on right posterior thigh and one on left posterior thigh, one on left but cheek Neurological: Negative for dizziness, weakness, numbness and headaches. Hematological: Does not bruise/bleed easily. Psychiatric/Behavioral: Negative for confusion and dysphoric mood. All other systems reviewed and are negative. Physical Exam  
 
Visit Vitals /84 (BP 1 Location: Left arm, BP Patient Position: At rest) Pulse 84 Temp 98.7 °F (37.1 °C) Resp 16 Ht 5' 10\" (1.778 m) Wt 90.7 kg (200 lb) SpO2 95% BMI 28.70 kg/m² Physical Exam  
Constitutional: He is oriented to person, place, and time. He appears well-developed and well-nourished. No distress. HENT:  
Head: Normocephalic and atraumatic. Mouth/Throat: Oropharynx is clear and moist.  
Eyes: Pupils are equal, round, and reactive to light. Conjunctivae are normal. No scleral icterus. Neck: Normal range of motion. Neck supple. Cardiovascular: Normal rate and intact distal pulses. Capillary refill < 3 seconds Pulmonary/Chest: Effort normal and breath sounds normal. No respiratory distress. He has no wheezes. Musculoskeletal: Normal range of motion. He exhibits no edema or tenderness. Lymphadenopathy:  
  He has no cervical adenopathy. Neurological: He is alert and oriented to person, place, and time. No cranial nerve deficit. Coordination normal.  
Skin: Skin is warm and dry. He is not diaphoretic. No tic noted in any of the wounds A tick bite noted on the posterior left and right thighs A tick bite noted on the left buttock. No fluctuance on buttock region there is some induration and erythema around the wound, no current drainage, is tender to palpate Psychiatric: He has a normal mood and affect. His behavior is normal.  
Nursing note and vitals reviewed. Diagnostic Study Results Labs - No results found for this or any previous visit (from the past 12 hour(s)). Radiologic Studies - No orders to display Medical Decision Making I am the first provider for this patient. I reviewed the vital signs, available nursing notes, past medical history, past surgical history, family history and social history. Vital Signs-Reviewed the patient's vital signs. Records Reviewed: Nursing Notes and Old Medical Records (Time of Review: 11:31 AM) Provider Notes (Medical Decision Making): DDX: Acute sinusitis, tick bite with cellulitis We will give prescription for doxycycline nasal spray and pain medication MDM Medications - No data to display ED Course: Progress Notes, Reevaluation, and Consults: 
I have reassessed the patient. I have discussed the workup, results and plan with the patient and patient is in agreement. Patient will be prescribed doxycycline, saline nasal spray, extra strength Tylenol. Patient was discharge in stable condition. Patient was given outpatient follow up. Patient is to return to emergency department if any new or worsening condition. Diagnosis Clinical Impression: 1. Tick bite, initial encounter 2. Cellulitis of buttock 3. Acute sinusitis, recurrence not specified, unspecified location Disposition: Discharged Follow-up Information Follow up With Specialties Details Why Contact Info Dave De La Torre MD Internal Medicine Schedule an appointment as soon as possible for a visit in 3 days  [address] [id number] [phone] [address] [facility] ([place], [place]) EMERGENCY DEPT Emergency Medicine  As needed, If symptoms worsen Tae Moseley [id number] [phone] Patient's Medications Start Taking ACETAMINOPHEN (ACETAMINOPHEN EXTRA STRENGTH) 500 MG TABLET    Take 1 Tab by mouth every six (6) hours as needed for Pain or Fever for up to 7 days. DOXYCYCLINE (VIBRA-TABS) 100 MG TABLET    Take 1 Tab by mouth two (2) times a day for 14 days. Indications: skin infection SODIUM CHLORIDE (SALINE NASAL) 0.65 % NASAL SQUEEZE BOTTLE    0.1 mL by Both Nostrils route every three (3) hours as needed for Congestion. Indications: stuffy nose Continue Taking AMLODIPINE (NORVASC) 5 MG TABLET    TAKE 1 TABLET DAILY ASCORBIC ACID, VITAMIN C, (VITAMIN C) 500 MG TABLET    Take 1,000 mg by mouth. CLOPIDOGREL (PLAVIX) 75 MG TAB    TAKE 1 TABLET DAILY  
 LABETALOL (NORMODYNE) 200 MG TABLET    TAKE 1 TABLET TWICE A DAY MULTIVITAMIN (ONE A DAY) TABLET    Take 1 Tab by mouth daily. These Medications have changed No medications on file Stop Taking CEFUROXIME (CEFTIN) 500 MG TABLET    Take 1 Tab by mouth two (2) times a day. PITAVASTATIN (LIVALO) 2 MG TABLET    Take 2 mg by mouth daily. Raji Cheema DO 
 
Dragon medical dictation software was used for portions of this report. Unintended transcription errors may occur. My signature above authenticates this document and my orders, the final   
diagnosis (es), discharge prescription (s), and instructions in the Epic   
record.

## 2019-05-24 NOTE — DISCHARGE INSTRUCTIONS
Patient Education        If you were prescribed any medication take as directed. Do not drive or use heavy equipment if prescribed narcotics. Follow up with your primary care physician or with specialist as directed. Return to the emergency room with any new or worsening conditions. Saline Nasal Washes: Care Instructions  Your Care Instructions  Saline nasal washes help keep the nasal passages open by washing out thick or dried mucus. This simple remedy can help relieve symptoms of allergies, sinusitis, and colds. It also can make the nose feel more comfortable by keeping the mucous membranes moist. You may notice a little burning sensation in your nose the first few times you use the solution, but this usually gets better in a few days. Follow-up care is a key part of your treatment and safety. Be sure to make and go to all appointments, and call your doctor if you are having problems. It's also a good idea to know your test results and keep a list of the medicines you take. How can you care for yourself at home? · You can buy premixed saline solution in a squeeze bottle or other sinus rinse products at a drugstore. Read and follow the instructions on the label. · You also can make your own saline solution by adding 1 teaspoon of salt and 1 teaspoon of baking soda to 2 cups of distilled water. · If you use a homemade solution, pour a small amount into a clean bowl. Using a rubber bulb syringe, squeeze the syringe and place the tip in the salt water. Pull a small amount of the salt water into the syringe by relaxing your hand. · Sit down with your head tilted slightly back. Do not lie down. Put the tip of the bulb syringe or the squeeze bottle a little way into one of your nostrils. Gently drip or squirt a few drops into the nostril. Repeat with the other nostril. Some sneezing and gagging are normal at first.  · Gently blow your nose. · Wipe the syringe or bottle tip clean after each use.   · Repeat this 2 or 3 times a day. · Use nasal washes gently if you have nosebleeds often. When should you call for help? Watch closely for changes in your health, and be sure to contact your doctor if:    · You often get nosebleeds.     · You have problems doing the nasal washes. Where can you learn more? Go to http://ubaldo-carlos.info/. Enter [id number] in the search box to learn more about \"Saline Nasal Washes: Care Instructions. \"  Current as of: March 27, 2018  Content Version: 11.9  © 3922-8562 [employer]. Care instructions adapted under license by [employer] (which disclaims liability or warranty for this information). If you have questions about a medical condition or this instruction, always ask your healthcare professional. [address] any warranty or liability for your use of this information. Patient Education        Sinusitis: Care Instructions  Your Care Instructions    Sinusitis is an infection of the lining of the sinus cavities in your head. Sinusitis often follows a cold. It causes pain and pressure in your head and face. In most cases, sinusitis gets better on its own in 1 to 2 weeks. But some mild symptoms may last for several weeks. Sometimes antibiotics are needed. Follow-up care is a key part of your treatment and safety. Be sure to make and go to all appointments, and call your doctor if you are having problems. It's also a good idea to know your test results and keep a list of the medicines you take. How can you care for yourself at home? · Take an over-the-counter pain medicine, such as acetaminophen (Tylenol), ibuprofen (Advil, Motrin), or naproxen (Aleve). Read and follow all instructions on the label. · If the doctor prescribed antibiotics, take them as directed. Do not stop taking them just because you feel better. You need to take the full course of antibiotics.   · Be careful when taking over-the-counter cold or flu medicines and Tylenol at the same time. Many of these medicines have acetaminophen, which is Tylenol. Read the labels to make sure that you are not taking more than the recommended dose. Too much acetaminophen (Tylenol) can be harmful. · Breathe warm, moist air from a steamy shower, a hot bath, or a sink filled with hot water. Avoid cold, dry air. Using a humidifier in your home may help. Follow the directions for cleaning the machine. · Use saline (saltwater) nasal washes to help keep your nasal passages open and wash out mucus and bacteria. You can buy saline nose drops at a grocery store or drugstore. Or you can make your own at home by adding 1 teaspoon of salt and 1 teaspoon of baking soda to 2 cups of distilled water. If you make your own, fill a bulb syringe with the solution, insert the tip into your nostril, and squeeze gently. Lorrin Fraction your nose. · Put a hot, wet towel or a warm gel pack on your face 3 or 4 times a day for 5 to 10 minutes each time. · Try a decongestant nasal spray like oxymetazoline (Afrin). Do not use it for more than 3 days in a row. Using it for more than 3 days can make your congestion worse. When should you call for help? Call your doctor now or seek immediate medical care if:    · You have new or worse swelling or redness in your face or around your eyes.     · You have a new or higher fever.    Watch closely for changes in your health, and be sure to contact your doctor if:    · You have new or worse facial pain.     · The mucus from your nose becomes thicker (like pus) or has new blood in it.     · You are not getting better as expected. Where can you learn more? Go to http://ubaldo-carlos.info/. Enter I179 in the search box to learn more about \"Sinusitis: Care Instructions. \"  Current as of: March 27, 2018  Content Version: 11.9  © 6617-8614 [employer].  Care instructions adapted under license by [employer] (which disclaims liability or warranty for this information). If you have questions about a medical condition or this instruction, always ask your healthcare professional. [address] any warranty or liability for your use of this information. Cellulitis: Care Instructions  Your Care Instructions    Cellulitis is a skin infection caused by bacteria, most often strep or staph. It often occurs after a break in the skin from a scrape, cut, bite, or puncture, or after a rash. Cellulitis may be treated without doing tests to find out what caused it. But your doctor may do tests, if needed, to look for a specific bacteria, like methicillin-resistant Staphylococcus aureus (MRSA). The doctor has checked you carefully, but problems can develop later. If you notice any problems or new symptoms, get medical treatment right away. Follow-up care is a key part of your treatment and safety. Be sure to make and go to all appointments, and call your doctor if you are having problems. It's also a good idea to know your test results and keep a list of the medicines you take. How can you care for yourself at home? · Take your antibiotics as directed. Do not stop taking them just because you feel better. You need to take the full course of antibiotics. · Prop up the infected area on pillows to reduce pain and swelling. Try to keep the area above the level of your heart as often as you can. · If your doctor told you how to care for your wound, follow your doctor's instructions. If you did not get instructions, follow this general advice:  ? Wash the wound with clean water 2 times a day. Don't use hydrogen peroxide or alcohol, which can slow healing. ? You may cover the wound with a thin layer of petroleum jelly, such as Vaseline, and a nonstick bandage. ? Apply more petroleum jelly and replace the bandage as needed. · Be safe with medicines. Take pain medicines exactly as directed.   ? If the doctor gave you a prescription medicine for pain, take it as prescribed. ? If you are not taking a prescription pain medicine, ask your doctor if you can take an over-the-counter medicine. To prevent cellulitis in the future  · Try to prevent cuts, scrapes, or other injuries to your skin. Cellulitis most often occurs where there is a break in the skin. · If you get a scrape, cut, mild burn, or bite, wash the wound with clean water as soon as you can to help avoid infection. Don't use hydrogen peroxide or alcohol, which can slow healing. · If you have swelling in your legs (edema), support stockings and good skin care may help prevent leg sores and cellulitis. · Take care of your feet, especially if you have diabetes or other conditions that increase the risk of infection. Wear shoes and socks. Do not go barefoot. If you have athlete's foot or other skin problems on your feet, talk to your doctor about how to treat them. When should you call for help? Call your doctor now or seek immediate medical care if:    · You have signs that your infection is getting worse, such as:  ? Increased pain, swelling, warmth, or redness. ? Red streaks leading from the area. ? Pus draining from the area. ? A fever.     · You get a rash.    Watch closely for changes in your health, and be sure to contact your doctor if:    · You do not get better as expected. Where can you learn more? Go to http://ubaldo-carlos.info/. Anatoliy Parikh in the search box to learn more about \"Cellulitis: Care Instructions. \"  Current as of: April 17, 2018  Content Version: 11.9  © 0083-5163 [employer]. Care instructions adapted under license by [employer] (which disclaims liability or warranty for this information). If you have questions about a medical condition or this instruction, always ask your healthcare professional. [address] any warranty or liability for your use of this information. Patient Education        Tick Bite: Care Instructions  Your Care Instructions    Ticks are small spiderlike animals. They bite to fasten themselves onto your skin and feed on your blood. Ticks can carry diseases. But most ticks do not carry diseases, and most tick bites do not cause serious health problems. Some people may have an allergic reaction to a tick bite. This reaction may be mild, with symptoms like itching and swelling. In rare cases, a severe allergic reaction may occur. Most of the time, all you need to do for a tick bite is relieve any symptoms you may have. Follow-up care is a key part of your treatment and safety. Be sure to make and go to all appointments, and call your doctor if you are having problems. It's also a good idea to know your test results and keep a list of the medicines you take. How can you care for yourself at home? · Put ice or a cold pack on the bite for 15 to 20 minutes once an hour. Put a thin cloth between the ice and your skin. · Try an over-the-counter medicine to relieve itching, redness, swelling, and pain. Be safe with medicines. Read and follow all instructions on the label. ? Take an antihistamine medicine, such as a nondrowsy one like loratadine (Claritin) or one that might make you sleepy like diphenhydramine (Benadryl). These medicines may help relieve itching, redness, and swelling. ? Use a spray of local anesthetic that contains benzocaine, such as Solarcaine. It may help relieve pain. If your skin reacts to the spray, stop using it. ? Put calamine lotion on the skin. It may help relieve itching. To avoid tick bites  · Avoid ticks:  ? Learn where ticks are found in your community, and stay away from those areas if possible. ? Cover as much of your body as possible when you work or play in grassy or wooded areas. ? Use insect repellents, such as products containing DEET. You can spray them on your skin.   ? Take steps to control ticks on your property if you live in an area where Lyme disease occurs. Clear leaves, brush, tall grasses, woodpiles, and stone fences from around your house and the edges of your yard or garden. This may help get rid of ticks. · When you come in from outdoors, check your body for ticks, including your groin, head, and underarms. The ticks may be about the size of a sesame seed. If no one else can help you check for ticks on your scalp, comb your hair with a fine-tooth comb. · If you find a tick, remove it quickly. Use tweezers to grasp the tick as close to its mouth (the part in your skin) as possible. Slowly pull the tick straight out--do not twist or yank--until its mouth releases from your skin. If part of the tick stays in the skin, leave it alone. It will likely come out on its own in a few days. · Ticks can come into your house on clothing, outdoor gear, and pets. These ticks can fall off and attach to you. ? Check your clothing and outdoor gear. Remove any ticks you find. Then put your clothing in a clothes dryer on high heat for 1 hour to kill any ticks that might remain. ? Check your pets for ticks after they have been outdoors. · When hiking in the woods, carry a small dry jar or ziplock bag. If you find a tick on your body, remove the tick and put it in the jar or bag. Store the container in the freezer so you can give it to your doctor if symptoms develop. The tick can be tested to learn whether it is carrying the bacteria that cause Lyme disease. When should you call for help? Call 911 anytime you think you may need emergency care. For example, call if:    · You have symptoms of a severe allergic reaction. These may include:  ? Sudden raised, red areas (hives) all over your body. ? Swelling of the throat, mouth, lips, or tongue. ? Trouble breathing. ? Passing out (losing consciousness).  Or you may feel very lightheaded or suddenly feel weak, confused, or restless.    Call your doctor now or seek immediate medical care if:    · You have signs of infection, such as:  ? Increased pain, swelling, warmth, or redness around the bite. ? Red streaks leading from the bite. ? Pus draining from the bite. ? A fever.    Watch closely for changes in your health, and be sure to contact your doctor if:    · You develop a new rash.     · You have joint pain.     · You are very tired.     · You have flu-like symptoms.     · You have symptoms for more than 1 week. Where can you learn more? Go to http://ubaldo-carlos.info/. Enter M807 in the search box to learn more about \"Tick Bite: Care Instructions. \"  Current as of: September 23, 2018  Content Version: 11.9  © 4791-5490 [employer]. Care instructions adapted under license by [employer] (which disclaims liability or warranty for this information). If you have questions about a medical condition or this instruction, always ask your healthcare professional. [address] [address] any warranty or liability for your use of this information.

## 2019-08-07 RX ORDER — CLOPIDOGREL BISULFATE 75 MG/1
TABLET ORAL
Qty: 90 TAB | Refills: 1 | Status: SHIPPED | OUTPATIENT
Start: 2019-08-07 | End: 2020-02-05

## 2019-08-07 RX ORDER — AMLODIPINE BESYLATE 5 MG/1
TABLET ORAL
Qty: 90 TAB | Refills: 1 | Status: SHIPPED | OUTPATIENT
Start: 2019-08-07 | End: 2020-02-05

## 2019-11-10 RX ORDER — LABETALOL 200 MG/1
TABLET, FILM COATED ORAL
Qty: 180 TAB | Refills: 4 | Status: ON HOLD | OUTPATIENT
Start: 2019-11-10 | End: 2021-01-01

## 2019-12-06 ENCOUNTER — HOSPITAL ENCOUNTER (OUTPATIENT)
Dept: LAB | Age: 59
Discharge: HOME | End: 2019-12-06
Payer: COMMERCIAL

## 2019-12-06 ENCOUNTER — OFFICE VISIT (OUTPATIENT)
Dept: FAMILY MEDICINE CLINIC | Facility: CLINIC | Age: 59
End: 2019-12-06

## 2019-12-06 VITALS
HEART RATE: 73 BPM | TEMPERATURE: 97.5 F | HEIGHT: 70 IN | BODY MASS INDEX: 27.49 KG/M2 | RESPIRATION RATE: 14 BRPM | DIASTOLIC BLOOD PRESSURE: 62 MMHG | OXYGEN SATURATION: 95 % | WEIGHT: 192 LBS | SYSTOLIC BLOOD PRESSURE: 110 MMHG

## 2019-12-06 DIAGNOSIS — I10 ESSENTIAL HYPERTENSION: Primary | ICD-10-CM

## 2019-12-06 DIAGNOSIS — I73.9 PERIPHERAL VASCULAR DISEASE (HCC): ICD-10-CM

## 2019-12-06 DIAGNOSIS — I10 ESSENTIAL HYPERTENSION: ICD-10-CM

## 2019-12-06 DIAGNOSIS — E78.00 PURE HYPERCHOLESTEROLEMIA: ICD-10-CM

## 2019-12-06 DIAGNOSIS — Z12.5 PROSTATE CANCER SCREENING: ICD-10-CM

## 2019-12-06 LAB
ALBUMIN SERPL-MCNC: 4 G/DL (ref 3.4–5)
ALBUMIN/GLOB SERPL: 1.1 {RATIO} (ref 0.8–1.7)
ALP SERPL-CCNC: 76 U/L (ref 45–117)
ALT SERPL-CCNC: 26 U/L (ref 16–61)
ANION GAP SERPL CALC-SCNC: 4 MMOL/L (ref 3–18)
AST SERPL-CCNC: 13 U/L (ref 10–38)
BASOPHILS # BLD: 0 K/UL (ref 0–0.1)
BASOPHILS NFR BLD: 1 % (ref 0–2)
BILIRUB SERPL-MCNC: 0.5 MG/DL (ref 0.2–1)
BUN SERPL-MCNC: 7 MG/DL (ref 7–18)
BUN/CREAT SERPL: 10 (ref 12–20)
CALCIUM SERPL-MCNC: 9.3 MG/DL (ref 8.5–10.1)
CHLORIDE SERPL-SCNC: 104 MMOL/L (ref 100–111)
CO2 SERPL-SCNC: 27 MMOL/L (ref 21–32)
CREAT SERPL-MCNC: 0.7 MG/DL (ref 0.6–1.3)
DIFFERENTIAL METHOD BLD: ABNORMAL
EOSINOPHIL # BLD: 0.2 K/UL (ref 0–0.4)
EOSINOPHIL NFR BLD: 3 % (ref 0–5)
ERYTHROCYTE [DISTWIDTH] IN BLOOD BY AUTOMATED COUNT: 13.9 % (ref 11.6–14.5)
GLOBULIN SER CALC-MCNC: 3.5 G/DL (ref 2–4)
GLUCOSE SERPL-MCNC: 110 MG/DL (ref 74–99)
HCT VFR BLD AUTO: 47.9 % (ref 36–48)
HGB BLD-MCNC: 15.9 G/DL (ref 13–16)
LYMPHOCYTES # BLD: 1.5 K/UL (ref 0.9–3.6)
LYMPHOCYTES NFR BLD: 23 % (ref 21–52)
MCH RBC QN AUTO: 33.1 PG (ref 24–34)
MCHC RBC AUTO-ENTMCNC: 33.2 G/DL (ref 31–37)
MCV RBC AUTO: 99.8 FL (ref 74–97)
MONOCYTES # BLD: 1.1 K/UL (ref 0.05–1.2)
MONOCYTES NFR BLD: 17 % (ref 3–10)
NEUTS SEG # BLD: 3.5 K/UL (ref 1.8–8)
NEUTS SEG NFR BLD: 56 % (ref 40–73)
PLATELET # BLD AUTO: 245 K/UL (ref 135–420)
PMV BLD AUTO: 10.5 FL (ref 9.2–11.8)
POTASSIUM SERPL-SCNC: 4.7 MMOL/L (ref 3.5–5.5)
PROT SERPL-MCNC: 7.5 G/DL (ref 6.4–8.2)
RBC # BLD AUTO: 4.8 M/UL (ref 4.7–5.5)
SODIUM SERPL-SCNC: 135 MMOL/L (ref 136–145)
WBC # BLD AUTO: 6.3 K/UL (ref 4.6–13.2)

## 2019-12-06 PROCEDURE — 80053 COMPREHEN METABOLIC PANEL: CPT

## 2019-12-06 PROCEDURE — 84153 ASSAY OF PSA TOTAL: CPT

## 2019-12-06 PROCEDURE — 80061 LIPID PANEL: CPT

## 2019-12-06 PROCEDURE — 85025 COMPLETE CBC W/AUTO DIFF WBC: CPT

## 2019-12-06 PROCEDURE — 36415 COLL VENOUS BLD VENIPUNCTURE: CPT

## 2019-12-06 NOTE — PROGRESS NOTES
John Singh presents today for No chief complaint on file. [place] Bronwyntamiko preferred language for health care discussion is english. Is someone accompanying this pt? no    Is the patient using any DME equipment during [address]? no    Depression Screening:  3 most recent PHQ Screens 11/30/2018   Little interest or pleasure in doing things Not at all   Feeling down, depressed, irritable, or hopeless Not at all   Total Score PHQ 2 0       Learning Assessment:  Learning Assessment 12/27/2016   PRIMARY LEARNER Patient   CO-LEARNER CAREGIVER -   PRIMARY LANGUAGE ENGLISH   LEARNER PREFERENCE PRIMARY LISTENING   ANSWERED BY patient   RELATIONSHIP SELF       Abuse Screening:  No flowsheet data found. Fall Risk  No flowsheet data found. Health Maintenance reviewed and discussed and ordered per Provider. Health Maintenance Due   Topic Date Due    Hepatitis C Screening  1960    Pneumococcal 0-64 years (1 of 1 - PPSV23) 07/01/1964    DTaP/Tdap/Td series (1 - Tdap) 07/01/1969    Shingrix Vaccine Age 50> (1 of 2) 07/01/2008    FOBT Q 1 YEAR AGE 50-75  07/01/2008    Influenza Age 9 to Adult  08/01/2019   . John Singh is updated on all     Pt currently taking Antiplatelet therapy? no    Coordination of Care:  1. Have you been to the ER, urgent care clinic since your last visit? no Hospitalized since your last visit? no    2. Have you seen or consulted any other health care providers outside of the [address] [place] [address] since your last visit? no Include any pap smears or colon screening.  no

## 2019-12-07 LAB
CHOLEST SERPL-MCNC: 178 MG/DL
HDLC SERPL-MCNC: 70 MG/DL (ref 40–60)
HDLC SERPL: 2.5 {RATIO} (ref 0–5)
LDLC SERPL CALC-MCNC: 97 MG/DL (ref 0–100)
LIPID PROFILE,FLP: ABNORMAL
PSA SERPL-MCNC: 3.1 NG/ML (ref 0–4)
TRIGL SERPL-MCNC: 55 MG/DL (ref ?–150)
VLDLC SERPL CALC-MCNC: 11 MG/DL

## 2019-12-11 NOTE — PROGRESS NOTES
The patient presents to the office today with the chief complaint of hypertension    HPI    The patient remains on amlodipine and labetalol for hypertension. Patient is doing well on this medication. The patient has peripheral vascular disease in both legs. The patient is status post several interventions since 2012. The patient denies claudication. The patient remains on a diet for hyperlipidemia. The patient continues with cigarette smoking. ROS  Negative for chest pain, dyspnea, abdominal pain, lower extremity edema, or claudication (currently)    No Known Allergies    Current Outpatient Medications   Medication Sig Dispense Refill    labetalol (NORMODYNE) 200 mg tablet TAKE 1 TABLET TWICE A  Tab 4    amLODIPine (NORVASC) 5 mg tablet TAKE 1 TABLET DAILY 90 Tab 1    clopidogrel (PLAVIX) 75 mg tab TAKE 1 TABLET DAILY 90 Tab 1    multivitamin (ONE A DAY) tablet Take 1 Tab by mouth daily.          Past Medical History:   Diagnosis Date    [facility]     Hypertension     Peripheral vascular disease [facility]     Pure hypercholesterolemia        Past Surgical History:   Procedure Laterality Date    HX ANGIOPLASTY Left 12/7/11    left leg    HX HEART CATHETERIZATION      HX KNEE ARTHROSCOPY  2/2008    HX OTHER SURGICAL  1982    jaw surgery       Social History     Socioeconomic History    Marital status:      Spouse name: Not on file    Number of children: Not on file    Years of education: Not on file    Highest education level: Not on file   Occupational History    Not on file   Social Needs    Financial resource strain: Not on file    Food insecurity:     Worry: Not on file     Inability: Not on file    Transportation needs:     Medical: Not on file     Non-medical: Not on file   Tobacco Use    Smoking status: Current Every Day Smoker     Packs/day: 2.00     Years: 20.00     Pack years: 40.00    Smokeless tobacco: Never Used Substance and Sexual Activity    Alcohol use: Yes     Alcohol/week: 17.5 standard drinks     Types: 21 Cans of beer per week    Drug use: No    Sexual activity: Never   Lifestyle    Physical activity:     Days per week: Not on file     Minutes per session: Not on file    Stress: Not on file   Relationships    Social connections:     Talks on phone: Not on file     Gets together: Not on file     Attends Jew service: Not on file     Active member of club or organization: Not on file     Attends meetings of clubs or organizations: Not on file     Relationship status: Not on file    Intimate partner violence:     Fear of current or ex partner: Not on file     Emotionally abused: Not on file     Physically abused: Not on file     Forced sexual activity: Not on file   Other Topics Concern    Not on file   Social History Narrative    Not on file       Patient does not have an advanced directive on file    Visit Vitals  /62 (BP 1 Location: Left arm, BP Patient Position: Sitting)   Pulse 73   Temp 97.5 °F (36.4 °C) (Tympanic)   Resp 14   Ht 5' 10\" (1.778 m)   Wt 192 lb (87.1 kg)   SpO2 95%   BMI 27.55 kg/m²       Physical Exam  No Cervical Lymphadenopathy  No Supraclavicular Lymphadenopathy  Thyroid is Normal  Lungs are normal to percussion. Clear to auscultation   Heart:  S1 S2 are normal, No gallops, No murmurs  No Carotid Bruits  Abdomen:  Normal Bowel Sounds. No tenderness. No masses. No Hepatomegaly or Splenomegaly  LE: Dorsalis pedal Pulses are present bilaterally. They are stronger on the right foot than the left.   No Edema      BMI:  [facility] Outpatient Visit on 12/06/2019   Component Date Value Ref Range Status    WBC 12/06/2019 6.3  4.6 - 13.2 K/uL Final    RBC 12/06/2019 4.80  4.70 - 5.50 M/uL Final    HGB 12/06/2019 15.9  13.0 - 16.0 g/dL Final    HCT 12/06/2019 47.9  36.0 - 48.0 % Final    MCV 12/06/2019 99.8* 74.0 - 97.0 FL Final    MCH 12/06/2019 33.1  24.0 - 34.0 PG Final    MCHC 12/06/2019 33.2  31.0 - 37.0 g/dL Final    RDW 12/06/2019 13.9  11.6 - 14.5 % Final    PLATELET 77/93/0283 309  135 - 420 K/uL Final    MPV 12/06/2019 10.5  9.2 - 11.8 FL Final    NEUTROPHILS 12/06/2019 56  40 - 73 % Final    LYMPHOCYTES 12/06/2019 23  21 - 52 % Final    MONOCYTES 12/06/2019 17* 3 - 10 % Final    EOSINOPHILS 12/06/2019 3  0 - 5 % Final    BASOPHILS 12/06/2019 1  0 - 2 % Final    ABS. NEUTROPHILS 12/06/2019 3.5  1.8 - 8.0 K/UL Final    ABS. LYMPHOCYTES 12/06/2019 1.5  0.9 - 3.6 K/UL Final    ABS. MONOCYTES 12/06/2019 1.1  0.05 - 1.2 K/UL Final    ABS. EOSINOPHILS 12/06/2019 0.2  0.0 - 0.4 K/UL Final    ABS. BASOPHILS 12/06/2019 0.0  0.0 - 0.1 K/UL Final    DF 12/06/2019 AUTOMATED    Final    Sodium 12/06/2019 135* 136 - 145 mmol/L Final    Potassium 12/06/2019 4.7  3.5 - 5.5 mmol/L Final    Chloride 12/06/2019 104  100 - 111 mmol/L Final    CO2 12/06/2019 27  21 - 32 mmol/L Final    Anion gap 12/06/2019 4  3.0 - 18 mmol/L Final    Glucose 12/06/2019 110* 74 - 99 mg/dL Final    BUN 12/06/2019 7  7.0 - 18 MG/DL Final    Creatinine 12/06/2019 0.70  0.6 - 1.3 MG/DL Final    BUN/Creatinine ratio 12/06/2019 10* 12 - 20   Final    GFR est AA 12/06/2019 >60  >60 ml/min/1.73m2 Final    GFR est non-AA 12/06/2019 >60  >60 ml/min/1.73m2 Final    Comment: (NOTE)  Estimated GFR is calculated using the Modification of Diet in Renal   Disease (MDRD) Study equation, reported for both  Americans   (GFRAA) and non- Americans (GFRNA), and normalized to 1.73m2   body surface area. The physician must decide which value applies to   the patient. The MDRD study equation should only be used in   individuals age 25 or older. It has not been validated for the   following: pregnant women, patients with serious comorbid conditions,   or on certain medications, or persons with extremes of body size,   muscle mass, or nutritional status.       Calcium 12/06/2019 9.3  8.5 - 10.1 MG/DL Final    Bilirubin, total 12/06/2019 0.5  0.2 - 1.0 MG/DL Final    ALT (SGPT) 12/06/2019 26  16 - 61 U/L Final    AST (SGOT) 12/06/2019 13  10 - 38 U/L Final    Alk. phosphatase 12/06/2019 76  45 - 117 U/L Final    Protein, total 12/06/2019 7.5  6.4 - 8.2 g/dL Final    Albumin 12/06/2019 4.0  3.4 - 5.0 g/dL Final    Globulin 12/06/2019 3.5  2.0 - 4.0 g/dL Final    A-G Ratio 12/06/2019 1.1  0.8 - 1.7   Final    LIPID PROFILE 12/06/2019        Final    Cholesterol, total 12/06/2019 178  <200 MG/DL Final    Triglyceride 12/06/2019 55  <150 MG/DL Final    Comment: The drugs N-acetylcysteine (NAC) and  Metamiszole have been found to cause falsely  low results in this chemical assay. Please  be sure to submit blood samples obtained  BEFORE administration of either of these  drugs to assure correct results.  HDL Cholesterol 12/06/2019 70* 40 - 60 MG/DL Final    LDL, calculated 12/06/2019 97  0 - 100 MG/DL Final    VLDL, calculated 12/06/2019 11  MG/DL Final    CHOL/HDL Ratio 12/06/2019 2.5  0 - 5.0   Final    Prostate Specific Ag 12/06/2019 3.1  0.0 - 4.0 ng/mL Final       .  Results for orders placed or performed during the hospital encounter of 12/06/19   CBC WITH AUTOMATED DIFF   Result Value Ref Range    WBC 6.3 4.6 - 13.2 K/uL    RBC 4.80 4.70 - 5.50 M/uL    HGB 15.9 13.0 - 16.0 g/dL    HCT 47.9 36.0 - 48.0 %    MCV 99.8 (H) 74.0 - 97.0 FL    MCH 33.1 24.0 - 34.0 PG    MCHC 33.2 31.0 - 37.0 g/dL    RDW 13.9 11.6 - 14.5 %    PLATELET 089 325 - 694 K/uL    MPV 10.5 9.2 - 11.8 FL    NEUTROPHILS 56 40 - 73 %    LYMPHOCYTES 23 21 - 52 %    MONOCYTES 17 (H) 3 - 10 %    EOSINOPHILS 3 0 - 5 %    BASOPHILS 1 0 - 2 %    ABS. NEUTROPHILS 3.5 1.8 - 8.0 K/UL    ABS. LYMPHOCYTES 1.5 0.9 - 3.6 K/UL    ABS. MONOCYTES 1.1 0.05 - 1.2 K/UL    ABS. EOSINOPHILS 0.2 0.0 - 0.4 K/UL    ABS.  BASOPHILS 0.0 0.0 - 0.1 K/UL    DF AUTOMATED     METABOLIC PANEL, COMPREHENSIVE   Result Value Ref Range    Sodium 135 (L) 136 - 145 mmol/L    Potassium 4.7 3.5 - 5.5 mmol/L    Chloride 104 100 - 111 mmol/L    CO2 27 21 - 32 mmol/L    Anion gap 4 3.0 - 18 mmol/L    Glucose 110 (H) 74 - 99 mg/dL    BUN 7 7.0 - 18 MG/DL    Creatinine 0.70 0.6 - 1.3 MG/DL    BUN/Creatinine ratio 10 (L) 12 - 20      GFR est AA >60 >60 ml/min/1.73m2    GFR est non-AA >60 >60 ml/min/1.73m2    Calcium 9.3 8.5 - 10.1 MG/DL    Bilirubin, total 0.5 0.2 - 1.0 MG/DL    ALT (SGPT) 26 16 - 61 U/L    AST (SGOT) 13 10 - 38 U/L    Alk. phosphatase 76 45 - 117 U/L    Protein, total 7.5 6.4 - 8.2 g/dL    Albumin 4.0 3.4 - 5.0 g/dL    Globulin 3.5 2.0 - 4.0 g/dL    A-G Ratio 1.1 0.8 - 1.7     LIPID PANEL   Result Value Ref Range    LIPID PROFILE          Cholesterol, total 178 <200 MG/DL    Triglyceride 55 <150 MG/DL    HDL Cholesterol 70 (H) 40 - 60 MG/DL    LDL, calculated 97 0 - 100 MG/DL    VLDL, calculated 11 MG/DL    CHOL/HDL Ratio 2.5 0 - 5.0     PSA SCREENING (SCREENING)   Result Value Ref Range    Prostate Specific Ag 3.1 0.0 - 4.0 ng/mL       Assessment / Plan      ICD-10-CM ICD-9-CM    1. Essential hypertension I10 401.9 CBC WITH AUTOMATED DIFF      METABOLIC PANEL, COMPREHENSIVE   2. Pure hypercholesterolemia E78.00 272.0 LIPID PANEL   3. Prostate cancer screening Z12.5 V76.44 PSA SCREENING (SCREENING)   4. Peripheral vascular disease (HCC) I73.9 443.9        Labs ordered  he was advised to continue his maintenance medications  The patient was counseled on the dangers of tobacco use, and was advised to quit. Reviewed strategies to maximize success, including removing cigarettes and smoking materials from environment. Follow-up and Dispositions    · Return in about 3 months (around 3/6/2020). I asked Juana Singh if he has any questions and I answered the questions. Juana Singh states that he understands the treatment plan and agrees with the treatment plan          THIS [facility].   IT MAY CONTAIN TRANSCRIPTION ERRORS

## 2020-01-01 ENCOUNTER — HOSPITAL ENCOUNTER (OUTPATIENT)
Dept: LAB | Age: 60
Discharge: HOME | End: 2020-12-28
Payer: COMMERCIAL

## 2020-01-01 LAB
ALBUMIN SERPL-MCNC: 3.9 G/DL (ref 3.4–5)
ALBUMIN/GLOB SERPL: 1.1 {RATIO} (ref 0.8–1.7)
ALP SERPL-CCNC: 75 U/L (ref 45–117)
ALT SERPL-CCNC: 27 U/L (ref 16–61)
ANION GAP SERPL CALC-SCNC: 7 MMOL/L (ref 3–18)
APPEARANCE UR: CLEAR
AST SERPL-CCNC: 23 U/L (ref 10–38)
BACTERIA URNS QL MICRO: NEGATIVE /HPF
BASOPHILS # BLD: 0.1 K/UL (ref 0–0.1)
BASOPHILS NFR BLD: 1 % (ref 0–2)
BILIRUB SERPL-MCNC: 0.6 MG/DL (ref 0.2–1)
BILIRUB UR QL: NEGATIVE
BUN SERPL-MCNC: 4 MG/DL (ref 7–18)
BUN/CREAT SERPL: 6 (ref 12–20)
CALCIUM SERPL-MCNC: 9 MG/DL (ref 8.5–10.1)
CHLORIDE SERPL-SCNC: 104 MMOL/L (ref 100–111)
CHOLEST SERPL-MCNC: 179 MG/DL
CO2 SERPL-SCNC: 26 MMOL/L (ref 21–32)
COLOR UR: ABNORMAL
CREAT SERPL-MCNC: 0.64 MG/DL (ref 0.6–1.3)
DIFFERENTIAL METHOD BLD: ABNORMAL
EOSINOPHIL # BLD: 0.3 K/UL (ref 0–0.4)
EOSINOPHIL NFR BLD: 4 % (ref 0–5)
EPITH CASTS URNS QL MICRO: NORMAL /LPF (ref 0–5)
ERYTHROCYTE [DISTWIDTH] IN BLOOD BY AUTOMATED COUNT: 13.2 % (ref 11.6–14.5)
GLOBULIN SER CALC-MCNC: 3.7 G/DL (ref 2–4)
GLUCOSE SERPL-MCNC: 95 MG/DL (ref 74–99)
GLUCOSE UR STRIP.AUTO-MCNC: NEGATIVE MG/DL
HCT VFR BLD AUTO: 45.5 % (ref 36–48)
HDLC SERPL-MCNC: 77 MG/DL (ref 40–60)
HDLC SERPL: 2.3 {RATIO} (ref 0–5)
HGB BLD-MCNC: 15.8 G/DL (ref 13–16)
HGB UR QL STRIP: NEGATIVE
KETONES UR QL STRIP.AUTO: NEGATIVE MG/DL
LDLC SERPL CALC-MCNC: 90.2 MG/DL (ref 0–100)
LEUKOCYTE ESTERASE UR QL STRIP.AUTO: ABNORMAL
LIPID PROFILE,FLP: ABNORMAL
LYMPHOCYTES # BLD: 1.5 K/UL (ref 0.9–3.6)
LYMPHOCYTES NFR BLD: 21 % (ref 21–52)
MCH RBC QN AUTO: 33.1 PG (ref 24–34)
MCHC RBC AUTO-ENTMCNC: 34.7 G/DL (ref 31–37)
MCV RBC AUTO: 95.2 FL (ref 74–97)
MONOCYTES # BLD: 1.3 K/UL (ref 0.05–1.2)
MONOCYTES NFR BLD: 18 % (ref 3–10)
NEUTS SEG # BLD: 4 K/UL (ref 1.8–8)
NEUTS SEG NFR BLD: 56 % (ref 40–73)
NITRITE UR QL STRIP.AUTO: NEGATIVE
PH UR STRIP: 6.5 [PH] (ref 5–8)
PLATELET # BLD AUTO: 259 K/UL (ref 135–420)
PMV BLD AUTO: 10.2 FL (ref 9.2–11.8)
POTASSIUM SERPL-SCNC: 4.7 MMOL/L (ref 3.5–5.5)
PROT SERPL-MCNC: 7.6 G/DL (ref 6.4–8.2)
PROT UR STRIP-MCNC: NEGATIVE MG/DL
RBC # BLD AUTO: 4.78 M/UL (ref 4.7–5.5)
RBC #/AREA URNS HPF: NEGATIVE /HPF (ref 0–5)
SODIUM SERPL-SCNC: 137 MMOL/L (ref 136–145)
SP GR UR REFRACTOMETRY: 1.01 (ref 1–1.03)
TRIGL SERPL-MCNC: 59 MG/DL (ref ?–150)
UROBILINOGEN UR QL STRIP.AUTO: 0.2 EU/DL (ref 0.2–1)
VLDLC SERPL CALC-MCNC: 11.8 MG/DL
WBC # BLD AUTO: 7.2 K/UL (ref 4.6–13.2)
WBC URNS QL MICRO: NORMAL /HPF (ref 0–4)

## 2020-01-01 PROCEDURE — 80061 LIPID PANEL: CPT

## 2020-01-01 PROCEDURE — 81001 URINALYSIS AUTO W/SCOPE: CPT

## 2020-01-01 PROCEDURE — 80053 COMPREHEN METABOLIC PANEL: CPT

## 2020-01-01 PROCEDURE — 36415 COLL VENOUS BLD VENIPUNCTURE: CPT

## 2020-01-01 PROCEDURE — 85025 COMPLETE CBC W/AUTO DIFF WBC: CPT

## 2020-01-31 ENCOUNTER — OFFICE VISIT (OUTPATIENT)
Dept: VASCULAR SURGERY | Age: 60
End: 2020-01-31

## 2020-01-31 VITALS
WEIGHT: 192 LBS | DIASTOLIC BLOOD PRESSURE: 76 MMHG | BODY MASS INDEX: 27.49 KG/M2 | RESPIRATION RATE: 17 BRPM | HEART RATE: 74 BPM | HEIGHT: 70 IN | SYSTOLIC BLOOD PRESSURE: 120 MMHG | OXYGEN SATURATION: 96 %

## 2020-01-31 DIAGNOSIS — I73.9 CLAUDICATION (HCC): ICD-10-CM

## 2020-01-31 DIAGNOSIS — I77.9 BILATERAL CAROTID ARTERY DISEASE, UNSPECIFIED TYPE (HCC): Primary | ICD-10-CM

## 2020-01-31 NOTE — PROGRESS NOTES
[facility]    Chief Complaint   Patient presents with    Carotid Artery Stenosis       History and Physical    Mr Nir Parsons is here for a 2 year follow up, now about 7 years out from a repeat iliac artery intervention for symptomatic claudication in 2012. We did in fact try to back track to find out when his original intervention was done, but could not determine that     But he had the stents originally placed and then developed recurrent stenosis with symptoms and had this last intervention in 2012 with good results. He has had no recurrent claudication symptoms. He feels he is well aware of the symptoms to call us if anything changes     Also, in spite of our previous discussions, he does continue to smoke. He has tried prescriptive and OTC options without results     But health is otherwise stable     Though he smokes he is on good medical therapy for PAD    Past Medical History:   Diagnosis Date    Claudication in peripheral vascular disease ([address]     Hypertension     Per[facility] vascular disease ([facility]     Pure hypercholesterolemia      Patient Active Problem List   Diagnosis Code    Pure hypercholesterolemia E78.00    Fracture of rib of left side S22.32XA     Past Surgical History:   Procedure Laterality Date    HX ANGIOPLASTY Left 12/7/11    left leg    HX HEART CATHETERIZATION      HX KNEE ARTHROSCOPY  2/2008    HX OTHER SURGICAL  1982    jaw surgery     Current Outpatient Medications   Medication Sig Dispense Refill    labetalol (NORMODYNE) 200 mg tablet TAKE 1 TABLET TWICE A  Tab 4    amLODIPine (NORVASC) 5 mg tablet TAKE 1 TABLET DAILY 90 Tab 1    clopidogrel (PLAVIX) 75 mg tab TAKE 1 TABLET DAILY 90 Tab 1    multivitamin (ONE A DAY) tablet Take 1 Tab by mouth daily.        No Known Allergies      Physical   Visit Vitals  /76 (BP 1 Location: Left arm, BP Patient Position: Sitting)   Pulse 74   Resp 17   Ht 5' 10\" (1.778 m)   Wt 192 lb (87.1 kg)   SpO2 96%   BMI 27.55 kg/m² General:  Alert, cooperative, no distress. Head:  Normocephalic, without obvious abnormality, atraumatic. Eyes:    Conjunctivae/corneas clear. Pupils equal, round, reactive to light. Extraocular movements intact. Neck:         No bruits   Lungs:   Clear to auscultation bilaterally. Heart:  Regular rate and rhythm, S1, S2 normal   Extremities: Extremities normal, atraumatic, no cyanosis or edema. Pulses: 1+ and symmetric all extremities. Skin: Skin color, texture, turgor normal. No rashes or lesions     Vascular studies:  Mild stenosis noted within bilateral internal carotid arteries  No evidence of hemodynamically significant arterial disease is identified within bilateral vertebral arteries    No evidence of arterial insufficiency identified within the right lower extremity at rest  Mild arterial disease is noted within the left lower extremity at rest of unclear level  No immediate identifiable aortoiliac disease post exercise    Impression/Plan:     ICD-10-CM ICD-9-CM    1. Bilateral carotid artery disease, unspecified type ([facility]) I73.9 447.9 DUPLEX CAROTID BILATERAL   2. Claudication ([facility]) I73.9 443.9 LOWER EXT ART PVR WITH EXERCISE     No orders of the defined types were placed in this encounter. Results and stability of them reviewed. He feels that he is well versed in the claudication symptoms over the years that he will chris if he starts to notice any symptoms! Follow-up and Dispositions    · Return in about 2 years (around 1/31/2022). SAMI Sauer    Portions of this note have been entered using voice recognition software.

## 2020-02-05 RX ORDER — CLOPIDOGREL BISULFATE 75 MG/1
TABLET ORAL
Qty: 90 TAB | Refills: 4 | Status: SHIPPED | OUTPATIENT
Start: 2020-02-05

## 2020-02-05 RX ORDER — AMLODIPINE BESYLATE 5 MG/1
TABLET ORAL
Qty: 90 TAB | Refills: 4 | Status: ON HOLD | OUTPATIENT
Start: 2020-02-05 | End: 2021-01-01

## 2021-01-01 ENCOUNTER — HOSPITAL ENCOUNTER (OUTPATIENT)
Dept: GENERAL RADIOLOGY | Age: 61
Discharge: HOME | End: 2021-02-17
Payer: COMMERCIAL

## 2021-01-01 ENCOUNTER — HOME CARE VISIT (OUTPATIENT)
Dept: HOSPICE | Facility: HOSPICE | Age: 61
End: 2021-01-01
Payer: COMMERCIAL

## 2021-01-01 ENCOUNTER — HOSPITAL ENCOUNTER (EMERGENCY)
Age: 61
Discharge: HOME | End: 2021-06-01
Attending: EMERGENCY MEDICINE
Payer: COMMERCIAL

## 2021-01-01 ENCOUNTER — HOSPITAL ENCOUNTER (EMERGENCY)
Dept: CT IMAGING | Age: 61
Discharge: HOME | DRG: 543 | End: 2021-06-18
Attending: EMERGENCY MEDICINE
Payer: COMMERCIAL

## 2021-01-01 ENCOUNTER — TELEPHONE (OUTPATIENT)
Dept: PULMONOLOGY | Age: 61
End: 2021-01-01

## 2021-01-01 ENCOUNTER — HOSPITAL ENCOUNTER (OUTPATIENT)
Dept: LAB | Age: 61
Discharge: HOME | End: 2021-03-12

## 2021-01-01 ENCOUNTER — HOSPITAL ENCOUNTER (OUTPATIENT)
Dept: NUCLEAR MEDICINE | Age: 61
Discharge: HOME | End: 2021-02-26
Attending: INTERNAL MEDICINE
Payer: COMMERCIAL

## 2021-01-01 ENCOUNTER — HOSPITAL ENCOUNTER (OUTPATIENT)
Dept: CT IMAGING | Age: 61
Discharge: HOME | End: 2021-02-10
Attending: NURSE PRACTITIONER
Payer: COMMERCIAL

## 2021-01-01 ENCOUNTER — HOSPITAL ENCOUNTER (OUTPATIENT)
Dept: PET IMAGING | Age: 61
Discharge: HOME | End: 2021-03-05
Attending: INTERNAL MEDICINE
Payer: COMMERCIAL

## 2021-01-01 ENCOUNTER — APPOINTMENT (OUTPATIENT)
Dept: GENERAL RADIOLOGY | Age: 61
End: 2021-01-01
Attending: INTERNAL MEDICINE
Payer: COMMERCIAL

## 2021-01-01 ENCOUNTER — HOSPICE ADMISSION (OUTPATIENT)
Dept: HOSPICE | Facility: HOSPICE | Age: 61
End: 2021-01-01
Payer: COMMERCIAL

## 2021-01-01 ENCOUNTER — HOME CARE VISIT (OUTPATIENT)
Dept: SCHEDULING | Facility: HOME HEALTH | Age: 61
End: 2021-01-01
Payer: COMMERCIAL

## 2021-01-01 ENCOUNTER — ANESTHESIA (OUTPATIENT)
Dept: ENDOSCOPY | Age: 61
End: 2021-01-01
Payer: COMMERCIAL

## 2021-01-01 ENCOUNTER — APPOINTMENT (OUTPATIENT)
Dept: CT IMAGING | Age: 61
End: 2021-01-01
Attending: EMERGENCY MEDICINE
Payer: COMMERCIAL

## 2021-01-01 ENCOUNTER — APPOINTMENT (OUTPATIENT)
Dept: VASCULAR SURGERY | Age: 61
End: 2021-01-01
Attending: PHYSICIAN ASSISTANT
Payer: COMMERCIAL

## 2021-01-01 ENCOUNTER — OFFICE VISIT (OUTPATIENT)
Dept: PULMONOLOGY | Age: 61
End: 2021-01-01
Payer: COMMERCIAL

## 2021-01-01 ENCOUNTER — HOSPITAL ENCOUNTER (OUTPATIENT)
Dept: GENERAL RADIOLOGY | Age: 61
Discharge: HOME | End: 2021-01-12
Payer: COMMERCIAL

## 2021-01-01 ENCOUNTER — APPOINTMENT (OUTPATIENT)
Dept: MRI IMAGING | Age: 61
DRG: 543 | End: 2021-01-01
Attending: EMERGENCY MEDICINE
Payer: COMMERCIAL

## 2021-01-01 ENCOUNTER — HOSPITAL ENCOUNTER (OUTPATIENT)
Dept: CT IMAGING | Age: 61
End: 2021-01-01
Attending: NURSE PRACTITIONER

## 2021-01-01 ENCOUNTER — OFFICE VISIT (OUTPATIENT)
Dept: PULMONOLOGY | Age: 61
End: 2021-01-01

## 2021-01-01 ENCOUNTER — HOSPITAL ENCOUNTER (OUTPATIENT)
Age: 61
Setting detail: OBSERVATION
Discharge: HOME | End: 2021-06-13
Attending: STUDENT IN AN ORGANIZED HEALTH CARE EDUCATION/TRAINING PROGRAM | Admitting: INTERNAL MEDICINE
Payer: COMMERCIAL

## 2021-01-01 ENCOUNTER — HOSPITAL ENCOUNTER (OUTPATIENT)
Dept: LAB | Age: 61
Discharge: HOME | End: 2021-02-17
Payer: COMMERCIAL

## 2021-01-01 ENCOUNTER — TRANSCRIBE ORDER (OUTPATIENT)
Dept: SCHEDULING | Age: 61
End: 2021-01-01

## 2021-01-01 ENCOUNTER — APPOINTMENT (OUTPATIENT)
Dept: GENERAL RADIOLOGY | Age: 61
End: 2021-01-01
Attending: EMERGENCY MEDICINE
Payer: COMMERCIAL

## 2021-01-01 ENCOUNTER — HOSPITAL ENCOUNTER (INPATIENT)
Age: 61
LOS: 1 days | Discharge: HOME | DRG: 543 | End: 2021-06-18
Attending: EMERGENCY MEDICINE | Admitting: EMERGENCY MEDICINE
Payer: COMMERCIAL

## 2021-01-01 ENCOUNTER — HOSPITAL ENCOUNTER (OUTPATIENT)
Age: 61
Setting detail: OUTPATIENT SURGERY
Discharge: HOME | End: 2021-02-23
Attending: INTERNAL MEDICINE | Admitting: INTERNAL MEDICINE
Payer: COMMERCIAL

## 2021-01-01 ENCOUNTER — APPOINTMENT (OUTPATIENT)
Dept: VASCULAR SURGERY | Age: 61
End: 2021-01-01
Attending: EMERGENCY MEDICINE
Payer: COMMERCIAL

## 2021-01-01 ENCOUNTER — APPOINTMENT (OUTPATIENT)
Dept: CT IMAGING | Age: 61
End: 2021-01-01
Attending: STUDENT IN AN ORGANIZED HEALTH CARE EDUCATION/TRAINING PROGRAM
Payer: COMMERCIAL

## 2021-01-01 ENCOUNTER — ANESTHESIA EVENT (OUTPATIENT)
Dept: ENDOSCOPY | Age: 61
End: 2021-01-01
Payer: COMMERCIAL

## 2021-01-01 ENCOUNTER — APPOINTMENT (OUTPATIENT)
Dept: GENERAL RADIOLOGY | Age: 61
DRG: 543 | End: 2021-01-01
Attending: EMERGENCY MEDICINE
Payer: COMMERCIAL

## 2021-01-01 ENCOUNTER — APPOINTMENT (OUTPATIENT)
Dept: GENERAL RADIOLOGY | Age: 61
End: 2021-01-01
Attending: STUDENT IN AN ORGANIZED HEALTH CARE EDUCATION/TRAINING PROGRAM
Payer: COMMERCIAL

## 2021-01-01 VITALS
BODY MASS INDEX: 22.19 KG/M2 | HEART RATE: 102 BPM | SYSTOLIC BLOOD PRESSURE: 106 MMHG | TEMPERATURE: 98.7 F | OXYGEN SATURATION: 53 % | RESPIRATION RATE: 18 BRPM | DIASTOLIC BLOOD PRESSURE: 70 MMHG | HEIGHT: 70 IN | WEIGHT: 155 LBS

## 2021-01-01 VITALS
DIASTOLIC BLOOD PRESSURE: 70 MMHG | SYSTOLIC BLOOD PRESSURE: 130 MMHG | OXYGEN SATURATION: 97 % | BODY MASS INDEX: 26.63 KG/M2 | RESPIRATION RATE: 20 BRPM | HEART RATE: 70 BPM | HEIGHT: 70 IN | TEMPERATURE: 98 F | WEIGHT: 186 LBS

## 2021-01-01 VITALS
BODY MASS INDEX: 26.83 KG/M2 | TEMPERATURE: 98.2 F | HEART RATE: 74 BPM | OXYGEN SATURATION: 96 % | RESPIRATION RATE: 18 BRPM | SYSTOLIC BLOOD PRESSURE: 148 MMHG | HEIGHT: 70 IN | WEIGHT: 187.4 LBS | DIASTOLIC BLOOD PRESSURE: 79 MMHG

## 2021-01-01 VITALS
RESPIRATION RATE: 22 BRPM | SYSTOLIC BLOOD PRESSURE: 110 MMHG | HEART RATE: 88 BPM | TEMPERATURE: 98.2 F | HEIGHT: 69 IN | WEIGHT: 150 LBS | DIASTOLIC BLOOD PRESSURE: 60 MMHG | OXYGEN SATURATION: 93 % | BODY MASS INDEX: 22.22 KG/M2

## 2021-01-01 VITALS — OXYGEN SATURATION: 97 % | SYSTOLIC BLOOD PRESSURE: 89 MMHG | DIASTOLIC BLOOD PRESSURE: 70 MMHG | HEART RATE: 129 BPM

## 2021-01-01 VITALS — BODY MASS INDEX: 26.63 KG/M2 | TEMPERATURE: 98.4 F | WEIGHT: 186 LBS | HEIGHT: 70 IN

## 2021-01-01 VITALS
WEIGHT: 173 LBS | DIASTOLIC BLOOD PRESSURE: 67 MMHG | BODY MASS INDEX: 24.82 KG/M2 | OXYGEN SATURATION: 100 % | SYSTOLIC BLOOD PRESSURE: 118 MMHG | HEART RATE: 104 BPM | RESPIRATION RATE: 17 BRPM

## 2021-01-01 VITALS
RESPIRATION RATE: 20 BRPM | HEART RATE: 72 BPM | BODY MASS INDEX: 26.14 KG/M2 | WEIGHT: 182.6 LBS | TEMPERATURE: 97 F | HEIGHT: 70 IN | OXYGEN SATURATION: 94 % | SYSTOLIC BLOOD PRESSURE: 145 MMHG | DIASTOLIC BLOOD PRESSURE: 80 MMHG

## 2021-01-01 VITALS
TEMPERATURE: 97.8 F | WEIGHT: 165.6 LBS | SYSTOLIC BLOOD PRESSURE: 156 MMHG | HEART RATE: 90 BPM | BODY MASS INDEX: 24.53 KG/M2 | HEIGHT: 69 IN | DIASTOLIC BLOOD PRESSURE: 93 MMHG | RESPIRATION RATE: 20 BRPM | OXYGEN SATURATION: 94 %

## 2021-01-01 VITALS — DIASTOLIC BLOOD PRESSURE: 84 MMHG | SYSTOLIC BLOOD PRESSURE: 112 MMHG | HEART RATE: 125 BPM

## 2021-01-01 VITALS — HEART RATE: 80 BPM

## 2021-01-01 DIAGNOSIS — C34.12 MALIGNANT NEOPLASM OF UPPER LOBE OF LEFT LUNG (HCC): Primary | ICD-10-CM

## 2021-01-01 DIAGNOSIS — R06.00 DYSPNEA: Primary | ICD-10-CM

## 2021-01-01 DIAGNOSIS — R22.41 HIP MASS, RIGHT: Primary | ICD-10-CM

## 2021-01-01 DIAGNOSIS — J44.9 COPD, GROUP D, BY GOLD 2017 CLASSIFICATION (HCC): ICD-10-CM

## 2021-01-01 DIAGNOSIS — J41.0 SIMPLE CHRONIC BRONCHITIS (HCC): ICD-10-CM

## 2021-01-01 DIAGNOSIS — F17.210 NICOTINE DEPENDENCE, CIGARETTES, UNCOMPLICATED: ICD-10-CM

## 2021-01-01 DIAGNOSIS — M94.0 COSTOCHONDRITIS: ICD-10-CM

## 2021-01-01 DIAGNOSIS — J98.4 CAVITATING MASS IN LEFT UPPER LUNG LOBE: ICD-10-CM

## 2021-01-01 DIAGNOSIS — Z87.891 PERSONAL HISTORY OF TOBACCO USE, PRESENTING HAZARDS TO HEALTH: ICD-10-CM

## 2021-01-01 DIAGNOSIS — R59.0 MEDIASTINAL LYMPHADENOPATHY: ICD-10-CM

## 2021-01-01 DIAGNOSIS — R59.0 HILAR LYMPHADENOPATHY: ICD-10-CM

## 2021-01-01 DIAGNOSIS — C79.9 METASTATIC ADENOCARCINOMA (HCC): ICD-10-CM

## 2021-01-01 DIAGNOSIS — T50.905A ADVERSE EFFECT OF DRUG, INITIAL ENCOUNTER: ICD-10-CM

## 2021-01-01 DIAGNOSIS — R07.82 INTERCOSTAL PAIN: ICD-10-CM

## 2021-01-01 DIAGNOSIS — R06.00 DYSPNEA: ICD-10-CM

## 2021-01-01 DIAGNOSIS — C34.92 PRIMARY ADENOCARCINOMA OF LEFT LUNG (HCC): ICD-10-CM

## 2021-01-01 DIAGNOSIS — D59.9 ACQUIRED HEMOLYTIC ANEMIA (HCC): ICD-10-CM

## 2021-01-01 DIAGNOSIS — R06.02 SOB (SHORTNESS OF BREATH): Primary | ICD-10-CM

## 2021-01-01 DIAGNOSIS — C34.91 MALIGNANT NEOPLASM OF RIGHT LUNG, UNSPECIFIED PART OF LUNG (HCC): Primary | ICD-10-CM

## 2021-01-01 DIAGNOSIS — C34.92 PRIMARY MALIGNANT NEOPLASM OF LEFT LUNG METASTATIC TO OTHER SITE (HCC): ICD-10-CM

## 2021-01-01 DIAGNOSIS — J44.1 ACUTE EXACERBATION OF CHRONIC OBSTRUCTIVE PULMONARY DISEASE (COPD) (HCC): Primary | ICD-10-CM

## 2021-01-01 DIAGNOSIS — C79.51 BONE METASTASES (HCC): Primary | ICD-10-CM

## 2021-01-01 DIAGNOSIS — M54.2 CERVICALGIA: ICD-10-CM

## 2021-01-01 DIAGNOSIS — M25.559 HIP PAIN: ICD-10-CM

## 2021-01-01 DIAGNOSIS — J98.4 CAVITATING MASS IN LEFT UPPER LUNG LOBE: Primary | ICD-10-CM

## 2021-01-01 LAB
ABO + RH BLD: NORMAL
ALBUMIN SERPL-MCNC: 2.6 G/DL (ref 3.4–5)
ALBUMIN SERPL-MCNC: 2.7 G/DL (ref 3.4–5)
ALBUMIN SERPL-MCNC: 2.7 G/DL (ref 3.4–5)
ALBUMIN SERPL-MCNC: 2.8 G/DL (ref 3.4–5)
ALBUMIN/GLOB SERPL: 0.6 {RATIO} (ref 0.8–1.7)
ALBUMIN/GLOB SERPL: 0.6 {RATIO} (ref 0.8–1.7)
ALBUMIN/GLOB SERPL: 0.7 {RATIO} (ref 0.8–1.7)
ALBUMIN/GLOB SERPL: 0.7 {RATIO} (ref 0.8–1.7)
ALP SERPL-CCNC: 107 U/L (ref 45–117)
ALP SERPL-CCNC: 76 U/L (ref 45–117)
ALP SERPL-CCNC: 87 U/L (ref 45–117)
ALP SERPL-CCNC: 99 U/L (ref 45–117)
ALT SERPL-CCNC: 13 U/L (ref 16–61)
ALT SERPL-CCNC: 15 U/L (ref 16–61)
ALT SERPL-CCNC: 16 U/L (ref 16–61)
ALT SERPL-CCNC: 16 U/L (ref 16–61)
ANION GAP SERPL CALC-SCNC: 2 MMOL/L (ref 3–18)
ANION GAP SERPL CALC-SCNC: 3 MMOL/L (ref 3–18)
ANION GAP SERPL CALC-SCNC: 3 MMOL/L (ref 3–18)
ANION GAP SERPL CALC-SCNC: 4 MMOL/L (ref 3–18)
ANION GAP SERPL CALC-SCNC: 5 MMOL/L (ref 3–18)
APPEARANCE UR: ABNORMAL
APPEARANCE UR: CLEAR
APPEARANCE UR: NORMAL
APTT PPP: 32.2 SEC (ref 23–36.4)
AST SERPL-CCNC: 21 U/L (ref 10–38)
AST SERPL-CCNC: 26 U/L (ref 10–38)
AST SERPL-CCNC: 34 U/L (ref 10–38)
AST SERPL-CCNC: 35 U/L (ref 10–38)
ATRIAL RATE: 101 BPM
ATRIAL RATE: 91 BPM
BACTERIA SPEC CULT: NORMAL
BASOPHILS # BLD: 0 K/UL (ref 0–0.1)
BASOPHILS NFR BLD: 0 % (ref 0–2)
BILIRUB DIRECT SERPL-MCNC: 0.2 MG/DL (ref 0–0.2)
BILIRUB SERPL-MCNC: 0.7 MG/DL (ref 0.2–1)
BILIRUB SERPL-MCNC: 0.7 MG/DL (ref 0.2–1)
BILIRUB SERPL-MCNC: 0.8 MG/DL (ref 0.2–1)
BILIRUB SERPL-MCNC: 0.9 MG/DL (ref 0.2–1)
BILIRUB UR QL: NEGATIVE
BLD PROD TYP BPU: NORMAL
BLD PROD TYP BPU: NORMAL
BLOOD GROUP ANTIBODIES SERPL: NORMAL
BNP SERPL-MCNC: 1059 PG/ML (ref 0–900)
BNP SERPL-MCNC: 800 PG/ML (ref 0–900)
BPU ID: NORMAL
BPU ID: NORMAL
BUN SERPL-MCNC: 10 MG/DL (ref 7–18)
BUN SERPL-MCNC: 11 MG/DL (ref 7–18)
BUN SERPL-MCNC: 12 MG/DL (ref 7–18)
BUN SERPL-MCNC: 7 MG/DL (ref 7–18)
BUN SERPL-MCNC: 9 MG/DL (ref 7–18)
BUN/CREAT SERPL: 14 (ref 12–20)
BUN/CREAT SERPL: 19 (ref 12–20)
BUN/CREAT SERPL: 25 (ref 12–20)
BUN/CREAT SERPL: 36 (ref 12–20)
BUN/CREAT SERPL: 43 (ref 12–20)
CALCIUM SERPL-MCNC: 8.6 MG/DL (ref 8.5–10.1)
CALCIUM SERPL-MCNC: 8.7 MG/DL (ref 8.5–10.1)
CALCIUM SERPL-MCNC: 8.8 MG/DL (ref 8.5–10.1)
CALCIUM SERPL-MCNC: 9.1 MG/DL (ref 8.5–10.1)
CALCIUM SERPL-MCNC: 9.2 MG/DL (ref 8.5–10.1)
CALCULATED P AXIS, ECG09: 103 DEGREES
CALCULATED P AXIS, ECG09: 95 DEGREES
CALCULATED R AXIS, ECG10: 23 DEGREES
CALCULATED R AXIS, ECG10: 31 DEGREES
CALCULATED T AXIS, ECG11: 31 DEGREES
CALCULATED T AXIS, ECG11: 34 DEGREES
CALLED TO:,BCALL1: NORMAL
CHLORIDE SERPL-SCNC: 101 MMOL/L (ref 100–111)
CHLORIDE SERPL-SCNC: 102 MMOL/L (ref 100–111)
CHLORIDE SERPL-SCNC: 94 MMOL/L (ref 100–111)
CHLORIDE SERPL-SCNC: 96 MMOL/L (ref 100–111)
CHLORIDE SERPL-SCNC: 97 MMOL/L (ref 100–111)
CO2 SERPL-SCNC: 29 MMOL/L (ref 21–32)
CO2 SERPL-SCNC: 30 MMOL/L (ref 21–32)
CO2 SERPL-SCNC: 31 MMOL/L (ref 21–32)
CO2 SERPL-SCNC: 31 MMOL/L (ref 21–32)
CO2 SERPL-SCNC: 32 MMOL/L (ref 21–32)
COLOR UR: ABNORMAL
COLOR UR: ABNORMAL
COLOR UR: NORMAL
COVID-19 RAPID TEST, COVR: NOT DETECTED
CREAT SERPL-MCNC: 0.28 MG/DL (ref 0.6–1.3)
CREAT SERPL-MCNC: 0.28 MG/DL (ref 0.6–1.3)
CREAT SERPL-MCNC: 0.36 MG/DL (ref 0.6–1.3)
CREAT SERPL-MCNC: 0.5 MG/DL (ref 0.6–1.3)
CREAT SERPL-MCNC: 0.57 MG/DL (ref 0.6–1.3)
CREAT UR-MCNC: 0.6 MG/DL (ref 0.6–1.3)
CROSSMATCH RESULT,%XM: NORMAL
CROSSMATCH RESULT,%XM: NORMAL
DAT POLY-SP REAG RBC QL: NORMAL
DIAGNOSIS, 93000: NORMAL
DIAGNOSIS, 93000: NORMAL
DIFFERENTIAL METHOD BLD: ABNORMAL
EOSINOPHIL # BLD: 0 K/UL (ref 0–0.4)
EOSINOPHIL # BLD: 0.1 K/UL (ref 0–0.4)
EOSINOPHIL # BLD: 0.2 K/UL (ref 0–0.4)
EOSINOPHIL # BLD: 0.4 K/UL (ref 0–0.4)
EOSINOPHIL NFR BLD: 0 % (ref 0–5)
EOSINOPHIL NFR BLD: 1 % (ref 0–5)
EOSINOPHIL NFR BLD: 1 % (ref 0–5)
EOSINOPHIL NFR BLD: 5 % (ref 0–5)
ERYTHROCYTE [DISTWIDTH] IN BLOOD BY AUTOMATED COUNT: 13.5 % (ref 11.6–14.5)
ERYTHROCYTE [DISTWIDTH] IN BLOOD BY AUTOMATED COUNT: 16.1 % (ref 11.6–14.5)
ERYTHROCYTE [DISTWIDTH] IN BLOOD BY AUTOMATED COUNT: 16.2 % (ref 11.6–14.5)
ERYTHROCYTE [DISTWIDTH] IN BLOOD BY AUTOMATED COUNT: 16.6 % (ref 11.6–14.5)
ERYTHROCYTE [DISTWIDTH] IN BLOOD BY AUTOMATED COUNT: 17 % (ref 11.6–14.5)
ERYTHROCYTE [DISTWIDTH] IN BLOOD BY AUTOMATED COUNT: 17.2 % (ref 11.6–14.5)
FERRITIN SERPL-MCNC: 1032 NG/ML (ref 8–388)
FOLATE SERPL-MCNC: >20 NG/ML (ref 3.1–17.5)
GLOBULIN SER CALC-MCNC: 3.7 G/DL (ref 2–4)
GLOBULIN SER CALC-MCNC: 3.7 G/DL (ref 2–4)
GLOBULIN SER CALC-MCNC: 4.5 G/DL (ref 2–4)
GLOBULIN SER CALC-MCNC: 4.8 G/DL (ref 2–4)
GLUCOSE BLD STRIP.AUTO-MCNC: 154 MG/DL (ref 70–110)
GLUCOSE BLD STRIP.AUTO-MCNC: 155 MG/DL (ref 70–110)
GLUCOSE BLD STRIP.AUTO-MCNC: 159 MG/DL (ref 70–110)
GLUCOSE BLD STRIP.AUTO-MCNC: 162 MG/DL (ref 70–110)
GLUCOSE BLD STRIP.AUTO-MCNC: 165 MG/DL (ref 70–110)
GLUCOSE BLD STRIP.AUTO-MCNC: 175 MG/DL (ref 70–110)
GLUCOSE BLD STRIP.AUTO-MCNC: 245 MG/DL (ref 70–110)
GLUCOSE SERPL-MCNC: 110 MG/DL (ref 74–99)
GLUCOSE SERPL-MCNC: 114 MG/DL (ref 74–99)
GLUCOSE SERPL-MCNC: 141 MG/DL (ref 74–99)
GLUCOSE SERPL-MCNC: 163 MG/DL (ref 74–99)
GLUCOSE SERPL-MCNC: 188 MG/DL (ref 74–99)
GLUCOSE UR STRIP.AUTO-MCNC: NEGATIVE MG/DL
HAPTOGLOB SERPL-MCNC: 157 MG/DL (ref 30–200)
HCT VFR BLD AUTO: 19.7 % (ref 36–48)
HCT VFR BLD AUTO: 20.5 % (ref 36–48)
HCT VFR BLD AUTO: 23.6 % (ref 36–48)
HCT VFR BLD AUTO: 23.7 % (ref 36–48)
HCT VFR BLD AUTO: 24.2 % (ref 36–48)
HCT VFR BLD AUTO: 25 % (ref 36–48)
HCT VFR BLD AUTO: 25.1 % (ref 36–48)
HCT VFR BLD AUTO: 25.4 % (ref 36–48)
HCT VFR BLD AUTO: 41.3 % (ref 36–48)
HEMOCCULT STL QL: NEGATIVE
HGB BLD-MCNC: 14.1 G/DL (ref 13–16)
HGB BLD-MCNC: 6.2 G/DL (ref 13–16)
HGB BLD-MCNC: 6.4 G/DL (ref 13–16)
HGB BLD-MCNC: 7.3 G/DL (ref 13–16)
HGB BLD-MCNC: 7.7 G/DL (ref 13–16)
HGB BLD-MCNC: 7.9 G/DL (ref 13–16)
HGB BLD-MCNC: 8 G/DL (ref 13–16)
HGB BLD-MCNC: 8.1 G/DL (ref 13–16)
HGB BLD-MCNC: 8.1 G/DL (ref 13–16)
HGB UR QL STRIP: NEGATIVE
HISTORY CHECKED?,CKHIST: NORMAL
INR PPP: 1 (ref 0.8–1.2)
INR PPP: 1.2 (ref 0.8–1.2)
INR PPP: 1.2 (ref 0.8–1.2)
IRON SATN MFR SERPL: 24 % (ref 20–50)
IRON SERPL-MCNC: 41 UG/DL (ref 50–175)
KETONES UR QL STRIP.AUTO: NEGATIVE MG/DL
LACTATE BLD-SCNC: 0.9 MMOL/L (ref 0.4–2)
LACTATE BLD-SCNC: 2 MMOL/L (ref 0.4–2)
LDH SERPL L TO P-CCNC: 677 U/L (ref 81–234)
LEUKOCYTE ESTERASE UR QL STRIP.AUTO: NEGATIVE
LIPASE SERPL-CCNC: 24 U/L (ref 73–393)
LYMPHOCYTES # BLD: 0.2 K/UL (ref 0.9–3.6)
LYMPHOCYTES # BLD: 0.3 K/UL (ref 0.9–3.6)
LYMPHOCYTES # BLD: 0.3 K/UL (ref 0.9–3.6)
LYMPHOCYTES # BLD: 0.6 K/UL (ref 0.9–3.6)
LYMPHOCYTES # BLD: 0.6 K/UL (ref 0.9–3.6)
LYMPHOCYTES # BLD: 2.1 K/UL (ref 0.9–3.6)
LYMPHOCYTES NFR BLD: 21 % (ref 21–52)
LYMPHOCYTES NFR BLD: 3 % (ref 21–52)
LYMPHOCYTES NFR BLD: 4 % (ref 21–52)
LYMPHOCYTES NFR BLD: 4 % (ref 21–52)
LYMPHOCYTES NFR BLD: 5 % (ref 21–52)
LYMPHOCYTES NFR BLD: 7 % (ref 21–52)
MAGNESIUM SERPL-MCNC: 1.6 MG/DL (ref 1.6–2.6)
MAGNESIUM SERPL-MCNC: 2.1 MG/DL (ref 1.6–2.6)
MCH RBC QN AUTO: 26.3 PG (ref 24–34)
MCH RBC QN AUTO: 26.8 PG (ref 24–34)
MCH RBC QN AUTO: 27.4 PG (ref 24–34)
MCH RBC QN AUTO: 27.4 PG (ref 24–34)
MCH RBC QN AUTO: 27.7 PG (ref 24–34)
MCH RBC QN AUTO: 32.3 PG (ref 24–34)
MCHC RBC AUTO-ENTMCNC: 30.8 G/DL (ref 31–37)
MCHC RBC AUTO-ENTMCNC: 31.5 G/DL (ref 31–37)
MCHC RBC AUTO-ENTMCNC: 31.9 G/DL (ref 31–37)
MCHC RBC AUTO-ENTMCNC: 32.6 G/DL (ref 31–37)
MCHC RBC AUTO-ENTMCNC: 32.6 G/DL (ref 31–37)
MCHC RBC AUTO-ENTMCNC: 34.1 G/DL (ref 31–37)
MCV RBC AUTO: 84 FL (ref 74–97)
MCV RBC AUTO: 84 FL (ref 74–97)
MCV RBC AUTO: 85.3 FL (ref 74–97)
MCV RBC AUTO: 85.3 FL (ref 74–97)
MCV RBC AUTO: 86.9 FL (ref 74–97)
MCV RBC AUTO: 94.7 FL (ref 74–97)
MONOCYTES # BLD: 0 K/UL (ref 0.05–1.2)
MONOCYTES # BLD: 0.5 K/UL (ref 0.05–1.2)
MONOCYTES # BLD: 0.6 K/UL (ref 0.05–1.2)
MONOCYTES # BLD: 0.6 K/UL (ref 0.05–1.2)
MONOCYTES # BLD: 1.7 K/UL (ref 0.05–1.2)
MONOCYTES # BLD: 3.1 K/UL (ref 0.05–1.2)
MONOCYTES NFR BLD: 1 % (ref 3–10)
MONOCYTES NFR BLD: 18 % (ref 3–10)
MONOCYTES NFR BLD: 24 % (ref 3–10)
MONOCYTES NFR BLD: 7 % (ref 3–10)
NEUTS SEG # BLD: 4.7 K/UL (ref 1.8–8)
NEUTS SEG # BLD: 6 K/UL (ref 1.8–8)
NEUTS SEG # BLD: 6.5 K/UL (ref 1.8–8)
NEUTS SEG # BLD: 6.5 K/UL (ref 1.8–8)
NEUTS SEG # BLD: 8.1 K/UL (ref 1.8–8)
NEUTS SEG # BLD: 9.4 K/UL (ref 1.8–8)
NEUTS SEG NFR BLD: 60 % (ref 40–73)
NEUTS SEG NFR BLD: 70 % (ref 40–73)
NEUTS SEG NFR BLD: 81 % (ref 40–73)
NEUTS SEG NFR BLD: 89 % (ref 40–73)
NEUTS SEG NFR BLD: 89 % (ref 40–73)
NEUTS SEG NFR BLD: 94 % (ref 40–73)
NITRITE UR QL STRIP.AUTO: NEGATIVE
P-R INTERVAL, ECG05: 130 MS
P-R INTERVAL, ECG05: 140 MS
PH UR STRIP: 6.5 [PH] (ref 5–8)
PH UR STRIP: 6.5 [PH] (ref 5–8)
PH UR STRIP: 7.5 [PH] (ref 5–8)
PLATELET # BLD AUTO: 276 K/UL (ref 135–420)
PLATELET # BLD AUTO: 343 K/UL (ref 135–420)
PLATELET # BLD AUTO: 423 K/UL (ref 135–420)
PLATELET # BLD AUTO: 454 K/UL (ref 135–420)
PLATELET # BLD AUTO: 463 K/UL (ref 135–420)
PLATELET # BLD AUTO: 489 K/UL (ref 135–420)
PLATELET COMMENTS,PCOM: ABNORMAL
PMV BLD AUTO: 10.1 FL (ref 9.2–11.8)
PMV BLD AUTO: 9.1 FL (ref 9.2–11.8)
PMV BLD AUTO: 9.2 FL (ref 9.2–11.8)
PMV BLD AUTO: 9.3 FL (ref 9.2–11.8)
PMV BLD AUTO: 9.5 FL (ref 9.2–11.8)
PMV BLD AUTO: 9.6 FL (ref 9.2–11.8)
POTASSIUM SERPL-SCNC: 3.2 MMOL/L (ref 3.5–5.5)
POTASSIUM SERPL-SCNC: 3.5 MMOL/L (ref 3.5–5.5)
POTASSIUM SERPL-SCNC: 4.1 MMOL/L (ref 3.5–5.5)
POTASSIUM SERPL-SCNC: 4.1 MMOL/L (ref 3.5–5.5)
POTASSIUM SERPL-SCNC: 4.5 MMOL/L (ref 3.5–5.5)
PROCALCITONIN SERPL-MCNC: <0.05 NG/ML
PROT SERPL-MCNC: 6.3 G/DL (ref 6.4–8.2)
PROT SERPL-MCNC: 6.4 G/DL (ref 6.4–8.2)
PROT SERPL-MCNC: 7.3 G/DL (ref 6.4–8.2)
PROT SERPL-MCNC: 7.5 G/DL (ref 6.4–8.2)
PROT UR STRIP-MCNC: ABNORMAL MG/DL
PROT UR STRIP-MCNC: NEGATIVE MG/DL
PROT UR STRIP-MCNC: NEGATIVE MG/DL
PROTHROMBIN TIME: 12.8 SEC (ref 11.5–15.2)
PROTHROMBIN TIME: 15.2 SEC (ref 11.5–15.2)
PROTHROMBIN TIME: 15.4 SEC (ref 11.5–15.2)
Q-T INTERVAL, ECG07: 316 MS
Q-T INTERVAL, ECG07: 352 MS
QRS DURATION, ECG06: 76 MS
QRS DURATION, ECG06: 76 MS
QTC CALCULATION (BEZET), ECG08: 409 MS
QTC CALCULATION (BEZET), ECG08: 432 MS
RBC # BLD AUTO: 2.31 M/UL (ref 4.35–5.65)
RBC # BLD AUTO: 2.78 M/UL (ref 4.35–5.65)
RBC # BLD AUTO: 2.81 M/UL (ref 4.35–5.65)
RBC # BLD AUTO: 2.88 M/UL (ref 4.35–5.65)
RBC # BLD AUTO: 2.89 M/UL (ref 4.35–5.65)
RBC # BLD AUTO: 4.36 M/UL (ref 4.7–5.5)
RBC MORPH BLD: ABNORMAL
RETICS/RBC NFR AUTO: 3.1 % (ref 0.5–2.5)
SARS-COV-2, COV2NT: NOT DETECTED
SERVICE CMNT-IMP: NORMAL
SODIUM SERPL-SCNC: 128 MMOL/L (ref 136–145)
SODIUM SERPL-SCNC: 130 MMOL/L (ref 136–145)
SODIUM SERPL-SCNC: 131 MMOL/L (ref 136–145)
SODIUM SERPL-SCNC: 134 MMOL/L (ref 136–145)
SODIUM SERPL-SCNC: 137 MMOL/L (ref 136–145)
SOURCE, COVRS: NORMAL
SP GR UR REFRACTOMETRY: 1.02 (ref 1–1.03)
SP GR UR REFRACTOMETRY: 1.02 (ref 1–1.03)
SP GR UR REFRACTOMETRY: >1.03 (ref 1–1.03)
SPECIMEN EXP DATE BLD: NORMAL
STATUS OF UNIT,%ST: NORMAL
STATUS OF UNIT,%ST: NORMAL
TIBC SERPL-MCNC: 172 UG/DL (ref 250–450)
TROPONIN I SERPL-MCNC: <0.02 NG/ML (ref 0–0.04)
TROPONIN I SERPL-MCNC: <0.02 NG/ML (ref 0–0.04)
UNIT DIVISION, %UDIV: 0
UNIT DIVISION, %UDIV: 0
UROBILINOGEN UR QL STRIP.AUTO: 0.2 EU/DL (ref 0.2–1)
UROBILINOGEN UR QL STRIP.AUTO: 1 EU/DL (ref 0.2–1)
UROBILINOGEN UR QL STRIP.AUTO: 1 EU/DL (ref 0.2–1)
VENTRICULAR RATE, ECG03: 101 BPM
VENTRICULAR RATE, ECG03: 91 BPM
VIT B12 SERPL-MCNC: >2000 PG/ML (ref 211–911)
WBC # BLD AUTO: 13.4 K/UL (ref 4.6–13.2)
WBC # BLD AUTO: 5 K/UL (ref 4.6–13.2)
WBC # BLD AUTO: 7.3 K/UL (ref 4.6–13.2)
WBC # BLD AUTO: 8.1 K/UL (ref 4.6–13.2)
WBC # BLD AUTO: 9.2 K/UL (ref 4.6–13.2)
WBC # BLD AUTO: 9.9 K/UL (ref 4.6–13.2)
WBC URNS QL MICRO: NORMAL /HPF (ref 0–4)

## 2021-01-01 PROCEDURE — 93005 ELECTROCARDIOGRAM TRACING: CPT

## 2021-01-01 PROCEDURE — 77030012699 HC VLV SUC CNTRL OCOA -A: Performed by: INTERNAL MEDICINE

## 2021-01-01 PROCEDURE — 83605 ASSAY OF LACTIC ACID: CPT

## 2021-01-01 PROCEDURE — 80053 COMPREHEN METABOLIC PANEL: CPT

## 2021-01-01 PROCEDURE — 96365 THER/PROPH/DIAG IV INF INIT: CPT

## 2021-01-01 PROCEDURE — 0651 HSPC ROUTINE HOME CARE

## 2021-01-01 PROCEDURE — 77030040392 HC DRSG OPTIFOAM MDII -A

## 2021-01-01 PROCEDURE — 82962 GLUCOSE BLOOD TEST: CPT

## 2021-01-01 PROCEDURE — 31624 DX BRONCHOSCOPE/LAVAGE: CPT | Performed by: INTERNAL MEDICINE

## 2021-01-01 PROCEDURE — 77030040393 HC DRSG OPTIFOAM GENT MDII -B

## 2021-01-01 PROCEDURE — 94761 N-INVAS EAR/PLS OXIMETRY MLT: CPT

## 2021-01-01 PROCEDURE — 96374 THER/PROPH/DIAG INJ IV PUSH: CPT

## 2021-01-01 PROCEDURE — 85025 COMPLETE CBC W/AUTO DIFF WBC: CPT

## 2021-01-01 PROCEDURE — 96376 TX/PRO/DX INJ SAME DRUG ADON: CPT

## 2021-01-01 PROCEDURE — 99406 BEHAV CHNG SMOKING 3-10 MIN: CPT | Performed by: INTERNAL MEDICINE

## 2021-01-01 PROCEDURE — 94640 AIRWAY INHALATION TREATMENT: CPT

## 2021-01-01 PROCEDURE — 65270000029 HC RM PRIVATE

## 2021-01-01 PROCEDURE — 74011250636 HC RX REV CODE- 250/636: Performed by: NURSE ANESTHETIST, CERTIFIED REGISTERED

## 2021-01-01 PROCEDURE — 88305 TISSUE EXAM BY PATHOLOGIST: CPT

## 2021-01-01 PROCEDURE — 88172 CYTP DX EVAL FNA 1ST EA SITE: CPT

## 2021-01-01 PROCEDURE — G0155 HHCP-SVS OF CSW,EA 15 MIN: HCPCS

## 2021-01-01 PROCEDURE — 83735 ASSAY OF MAGNESIUM: CPT

## 2021-01-01 PROCEDURE — 74011000258 HC RX REV CODE- 258: Performed by: EMERGENCY MEDICINE

## 2021-01-01 PROCEDURE — G0156 HHCP-SVS OF AIDE,EA 15 MIN: HCPCS

## 2021-01-01 PROCEDURE — 74011250636 HC RX REV CODE- 250/636: Performed by: EMERGENCY MEDICINE

## 2021-01-01 PROCEDURE — 36415 COLL VENOUS BLD VENIPUNCTURE: CPT

## 2021-01-01 PROCEDURE — 83010 ASSAY OF HAPTOGLOBIN QUANT: CPT

## 2021-01-01 PROCEDURE — 31628 BRONCHOSCOPY/LUNG BX EACH: CPT | Performed by: INTERNAL MEDICINE

## 2021-01-01 PROCEDURE — U0003 INFECTIOUS AGENT DETECTION BY NUCLEIC ACID (DNA OR RNA); SEVERE ACUTE RESPIRATORY SYNDROME CORONAVIRUS 2 (SARS-COV-2) (CORONAVIRUS DISEASE [COVID-19]), AMPLIFIED PROBE TECHNIQUE, MAKING USE OF HIGH THROUGHPUT TECHNOLOGIES AS DESCRIBED BY CMS-2020-01-R: HCPCS

## 2021-01-01 PROCEDURE — 65660000000 HC RM CCU STEPDOWN

## 2021-01-01 PROCEDURE — G0299 HHS/HOSPICE OF RN EA 15 MIN: HCPCS

## 2021-01-01 PROCEDURE — 77010033678 HC OXYGEN DAILY

## 2021-01-01 PROCEDURE — 83880 ASSAY OF NATRIURETIC PEPTIDE: CPT

## 2021-01-01 PROCEDURE — 74011636637 HC RX REV CODE- 636/637: Performed by: PHYSICIAN ASSISTANT

## 2021-01-01 PROCEDURE — 71045 X-RAY EXAM CHEST 1 VIEW: CPT

## 2021-01-01 PROCEDURE — 81003 URINALYSIS AUTO W/O SCOPE: CPT

## 2021-01-01 PROCEDURE — 74011250637 HC RX REV CODE- 250/637: Performed by: EMERGENCY MEDICINE

## 2021-01-01 PROCEDURE — 82270 OCCULT BLOOD FECES: CPT

## 2021-01-01 PROCEDURE — 83550 IRON BINDING TEST: CPT

## 2021-01-01 PROCEDURE — 36430 TRANSFUSION BLD/BLD COMPNT: CPT

## 2021-01-01 PROCEDURE — 84484 ASSAY OF TROPONIN QUANT: CPT

## 2021-01-01 PROCEDURE — 73721 MRI JNT OF LWR EXTRE W/O DYE: CPT

## 2021-01-01 PROCEDURE — 85014 HEMATOCRIT: CPT

## 2021-01-01 PROCEDURE — 93970 EXTREMITY STUDY: CPT

## 2021-01-01 PROCEDURE — 99218 HC RM OBSERVATION: CPT

## 2021-01-01 PROCEDURE — 85045 AUTOMATED RETICULOCYTE COUNT: CPT

## 2021-01-01 PROCEDURE — 87040 BLOOD CULTURE FOR BACTERIA: CPT

## 2021-01-01 PROCEDURE — 97165 OT EVAL LOW COMPLEX 30 MIN: CPT

## 2021-01-01 PROCEDURE — 93971 EXTREMITY STUDY: CPT

## 2021-01-01 PROCEDURE — 31654 BRONCH EBUS IVNTJ PERPH LES: CPT | Performed by: INTERNAL MEDICINE

## 2021-01-01 PROCEDURE — 97162 PT EVAL MOD COMPLEX 30 MIN: CPT

## 2021-01-01 PROCEDURE — 71260 CT THORAX DX C+: CPT

## 2021-01-01 PROCEDURE — 72050 X-RAY EXAM NECK SPINE 4/5VWS: CPT

## 2021-01-01 PROCEDURE — 84145 PROCALCITONIN (PCT): CPT

## 2021-01-01 PROCEDURE — 74011250636 HC RX REV CODE- 250/636: Performed by: PHYSICIAN ASSISTANT

## 2021-01-01 PROCEDURE — 96368 THER/DIAG CONCURRENT INF: CPT

## 2021-01-01 PROCEDURE — 77030022556 HC FCPS BIOP TIS ENDOSC BSC -B: Performed by: INTERNAL MEDICINE

## 2021-01-01 PROCEDURE — P9016 RBC LEUKOCYTES REDUCED: HCPCS

## 2021-01-01 PROCEDURE — 96375 TX/PRO/DX INJ NEW DRUG ADDON: CPT

## 2021-01-01 PROCEDURE — 71046 X-RAY EXAM CHEST 2 VIEWS: CPT

## 2021-01-01 PROCEDURE — 74011000636 HC RX REV CODE- 636: Performed by: EMERGENCY MEDICINE

## 2021-01-01 PROCEDURE — 86923 COMPATIBILITY TEST ELECTRIC: CPT

## 2021-01-01 PROCEDURE — 76040000009: Performed by: INTERNAL MEDICINE

## 2021-01-01 PROCEDURE — A9552 F18 FDG: HCPCS

## 2021-01-01 PROCEDURE — 85610 PROTHROMBIN TIME: CPT

## 2021-01-01 PROCEDURE — HOSPICE MEDICATION HC HH HOSPICE MEDICATION

## 2021-01-01 PROCEDURE — 99205 OFFICE O/P NEW HI 60 MIN: CPT | Performed by: INTERNAL MEDICINE

## 2021-01-01 PROCEDURE — 74011000636 HC RX REV CODE- 636: Performed by: STUDENT IN AN ORGANIZED HEALTH CARE EDUCATION/TRAINING PROGRAM

## 2021-01-01 PROCEDURE — 2709999900 HC NON-CHARGEABLE SUPPLY

## 2021-01-01 PROCEDURE — 94618 PULMONARY STRESS TESTING: CPT | Performed by: INTERNAL MEDICINE

## 2021-01-01 PROCEDURE — 74011000250 HC RX REV CODE- 250: Performed by: INTERNAL MEDICINE

## 2021-01-01 PROCEDURE — 74011000250 HC RX REV CODE- 250: Performed by: STUDENT IN AN ORGANIZED HEALTH CARE EDUCATION/TRAINING PROGRAM

## 2021-01-01 PROCEDURE — 81001 URINALYSIS AUTO W/SCOPE: CPT

## 2021-01-01 PROCEDURE — 77030018823 HC SLV COMPR VENO -B: Performed by: INTERNAL MEDICINE

## 2021-01-01 PROCEDURE — 74011250637 HC RX REV CODE- 250/637: Performed by: NURSE ANESTHETIST, CERTIFIED REGISTERED

## 2021-01-01 PROCEDURE — 71275 CT ANGIOGRAPHY CHEST: CPT

## 2021-01-01 PROCEDURE — 80076 HEPATIC FUNCTION PANEL: CPT

## 2021-01-01 PROCEDURE — 74011000250 HC RX REV CODE- 250: Performed by: EMERGENCY MEDICINE

## 2021-01-01 PROCEDURE — 83615 LACTATE (LD) (LDH) ENZYME: CPT

## 2021-01-01 PROCEDURE — 96366 THER/PROPH/DIAG IV INF ADDON: CPT

## 2021-01-01 PROCEDURE — 87635 SARS-COV-2 COVID-19 AMP PRB: CPT

## 2021-01-01 PROCEDURE — 82565 ASSAY OF CREATININE: CPT

## 2021-01-01 PROCEDURE — 97530 THERAPEUTIC ACTIVITIES: CPT

## 2021-01-01 PROCEDURE — 88342 IMHCHEM/IMCYTCHM 1ST ANTB: CPT

## 2021-01-01 PROCEDURE — 94727 GAS DIL/WSHOT DETER LNG VOL: CPT | Performed by: INTERNAL MEDICINE

## 2021-01-01 PROCEDURE — 77030029383 HC FCPS ENDO BIOP PULM BSC -B: Performed by: INTERNAL MEDICINE

## 2021-01-01 PROCEDURE — 71100 X-RAY EXAM RIBS UNI 2 VIEWS: CPT

## 2021-01-01 PROCEDURE — 74011000636 HC RX REV CODE- 636

## 2021-01-01 PROCEDURE — 94729 DIFFUSING CAPACITY: CPT | Performed by: INTERNAL MEDICINE

## 2021-01-01 PROCEDURE — 86880 COOMBS TEST DIRECT: CPT

## 2021-01-01 PROCEDURE — 74011250637 HC RX REV CODE- 250/637: Performed by: PHYSICIAN ASSISTANT

## 2021-01-01 PROCEDURE — 78306 BONE IMAGING WHOLE BODY: CPT

## 2021-01-01 PROCEDURE — 00320 ANES ALL PX NECK NOS 1YR/>: CPT | Performed by: NURSE ANESTHETIST, CERTIFIED REGISTERED

## 2021-01-01 PROCEDURE — 2709999900 HC NON-CHARGEABLE SUPPLY: Performed by: INTERNAL MEDICINE

## 2021-01-01 PROCEDURE — 77030003454 HC NDL BIOP BRONCH BSC -B: Performed by: INTERNAL MEDICINE

## 2021-01-01 PROCEDURE — 00320 ANES ALL PX NECK NOS 1YR/>: CPT | Performed by: ANESTHESIOLOGY

## 2021-01-01 PROCEDURE — 94060 EVALUATION OF WHEEZING: CPT | Performed by: INTERNAL MEDICINE

## 2021-01-01 PROCEDURE — 74011250637 HC RX REV CODE- 250/637: Performed by: INTERNAL MEDICINE

## 2021-01-01 PROCEDURE — 88177 CYTP FNA EVAL EA ADDL: CPT

## 2021-01-01 PROCEDURE — 74011000250 HC RX REV CODE- 250

## 2021-01-01 PROCEDURE — 77030003406 HC NDL ASPIR BIOP OCOA -C: Performed by: INTERNAL MEDICINE

## 2021-01-01 PROCEDURE — 80048 BASIC METABOLIC PNL TOTAL CA: CPT

## 2021-01-01 PROCEDURE — 77030013140 HC MSK NEB VYRM -A: Performed by: INTERNAL MEDICINE

## 2021-01-01 PROCEDURE — 88341 IMHCHEM/IMCYTCHM EA ADD ANTB: CPT

## 2021-01-01 PROCEDURE — 99285 EMERGENCY DEPT VISIT HI MDM: CPT

## 2021-01-01 PROCEDURE — 74011250636 HC RX REV CODE- 250/636: Performed by: STUDENT IN AN ORGANIZED HEALTH CARE EDUCATION/TRAINING PROGRAM

## 2021-01-01 PROCEDURE — 99233 SBSQ HOSP IP/OBS HIGH 50: CPT | Performed by: INTERNAL MEDICINE

## 2021-01-01 PROCEDURE — 99223 1ST HOSP IP/OBS HIGH 75: CPT | Performed by: INTERNAL MEDICINE

## 2021-01-01 PROCEDURE — 88112 CYTOPATH CELL ENHANCE TECH: CPT

## 2021-01-01 PROCEDURE — 31623 DX BRONCHOSCOPE/BRUSH: CPT | Performed by: INTERNAL MEDICINE

## 2021-01-01 PROCEDURE — 96361 HYDRATE IV INFUSION ADD-ON: CPT

## 2021-01-01 PROCEDURE — 77030003400 HC NDL ASPIR BIOP CNMD -B: Performed by: INTERNAL MEDICINE

## 2021-01-01 PROCEDURE — 77030027138 HC INCENT SPIROMETER -A

## 2021-01-01 PROCEDURE — 74177 CT ABD & PELVIS W/CONTRAST: CPT

## 2021-01-01 PROCEDURE — 83690 ASSAY OF LIPASE: CPT

## 2021-01-01 PROCEDURE — 88173 CYTOPATH EVAL FNA REPORT: CPT

## 2021-01-01 PROCEDURE — 85730 THROMBOPLASTIN TIME PARTIAL: CPT

## 2021-01-01 PROCEDURE — 82746 ASSAY OF FOLIC ACID SERUM: CPT

## 2021-01-01 PROCEDURE — APPSS60 APP SPLIT SHARED TIME 46-60 MINUTES: Performed by: NURSE PRACTITIONER

## 2021-01-01 PROCEDURE — 99232 SBSQ HOSP IP/OBS MODERATE 35: CPT | Performed by: EMERGENCY MEDICINE

## 2021-01-01 PROCEDURE — 31652 BRONCH EBUS SAMPLNG 1/2 NODE: CPT | Performed by: INTERNAL MEDICINE

## 2021-01-01 PROCEDURE — APPSS180 APP SPLIT SHARED TIME > 60 MINUTES: Performed by: NURSE PRACTITIONER

## 2021-01-01 PROCEDURE — 82728 ASSAY OF FERRITIN: CPT

## 2021-01-01 PROCEDURE — 74011000250 HC RX REV CODE- 250: Performed by: NURSE ANESTHETIST, CERTIFIED REGISTERED

## 2021-01-01 PROCEDURE — 3336500001 HSPC ELECTION

## 2021-01-01 PROCEDURE — 76060000034 HC ANESTHESIA 1.5 TO 2 HR: Performed by: INTERNAL MEDICINE

## 2021-01-01 PROCEDURE — 86900 BLOOD TYPING SEROLOGIC ABO: CPT

## 2021-01-01 RX ORDER — GUAIFENESIN 100 MG/5ML
200 SOLUTION ORAL
Status: COMPLETED | OUTPATIENT
Start: 2021-01-01 | End: 2021-01-01

## 2021-01-01 RX ORDER — FAMOTIDINE 20 MG/1
20 TABLET, FILM COATED ORAL ONCE
Status: COMPLETED | OUTPATIENT
Start: 2021-01-01 | End: 2021-01-01

## 2021-01-01 RX ORDER — CALCIPOTRIENE 50 UG/G
CREAM TOPICAL
COMMUNITY
Start: 2020-01-01 | End: 2021-01-01

## 2021-01-01 RX ORDER — OXYCODONE HYDROCHLORIDE 5 MG/1
10 TABLET ORAL
Status: DISCONTINUED | OUTPATIENT
Start: 2021-01-01 | End: 2021-01-01 | Stop reason: HOSPADM

## 2021-01-01 RX ORDER — LANOLIN ALCOHOL/MO/W.PET/CERES
1 CREAM (GRAM) TOPICAL
Status: DISCONTINUED | OUTPATIENT
Start: 2021-01-01 | End: 2021-01-01 | Stop reason: HOSPADM

## 2021-01-01 RX ORDER — OXYCODONE HYDROCHLORIDE 5 MG/1
5 TABLET ORAL
Status: COMPLETED | OUTPATIENT
Start: 2021-01-01 | End: 2021-01-01

## 2021-01-01 RX ORDER — FAMOTIDINE 20 MG/1
20 TABLET, FILM COATED ORAL 2 TIMES DAILY
Status: DISCONTINUED | OUTPATIENT
Start: 2021-01-01 | End: 2021-01-01 | Stop reason: HOSPADM

## 2021-01-01 RX ORDER — ONDANSETRON 2 MG/ML
4 INJECTION INTRAMUSCULAR; INTRAVENOUS
Status: COMPLETED | OUTPATIENT
Start: 2021-01-01 | End: 2021-01-01

## 2021-01-01 RX ORDER — DOCUSATE SODIUM 100 MG/1
100 CAPSULE, LIQUID FILLED ORAL 2 TIMES DAILY
Status: DISCONTINUED | OUTPATIENT
Start: 2021-01-01 | End: 2021-01-01 | Stop reason: HOSPADM

## 2021-01-01 RX ORDER — FENTANYL CITRATE 50 UG/ML
25 INJECTION, SOLUTION INTRAMUSCULAR; INTRAVENOUS
Status: COMPLETED | OUTPATIENT
Start: 2021-01-01 | End: 2021-01-01

## 2021-01-01 RX ORDER — PREDNISONE 10 MG/1
TABLET ORAL
Qty: 42 TAB | Refills: 0 | Status: SHIPPED | OUTPATIENT
Start: 2021-01-01 | End: 2021-01-01

## 2021-01-01 RX ORDER — CLOPIDOGREL BISULFATE 75 MG/1
75 TABLET ORAL DAILY
Status: DISCONTINUED | OUTPATIENT
Start: 2021-01-01 | End: 2021-01-01 | Stop reason: HOSPADM

## 2021-01-01 RX ORDER — SODIUM CHLORIDE 0.9 % (FLUSH) 0.9 %
5-10 SYRINGE (ML) INJECTION AS NEEDED
Status: DISCONTINUED | OUTPATIENT
Start: 2021-01-01 | End: 2021-01-01 | Stop reason: HOSPADM

## 2021-01-01 RX ORDER — FENTANYL CITRATE 50 UG/ML
INJECTION, SOLUTION INTRAMUSCULAR; INTRAVENOUS AS NEEDED
Status: DISCONTINUED | OUTPATIENT
Start: 2021-01-01 | End: 2021-01-01 | Stop reason: HOSPADM

## 2021-01-01 RX ORDER — FENTANYL 50 UG/1
1 PATCH TRANSDERMAL
Qty: 10 PATCH | Refills: 0 | Status: SHIPPED | OUTPATIENT
Start: 2021-01-01 | End: 2021-01-01

## 2021-01-01 RX ORDER — FUROSEMIDE 10 MG/ML
40 INJECTION INTRAMUSCULAR; INTRAVENOUS ONCE
Status: COMPLETED | OUTPATIENT
Start: 2021-01-01 | End: 2021-01-01

## 2021-01-01 RX ORDER — CYCLOBENZAPRINE HCL 10 MG
10 TABLET ORAL
Status: DISCONTINUED | OUTPATIENT
Start: 2021-01-01 | End: 2021-01-01 | Stop reason: HOSPADM

## 2021-01-01 RX ORDER — AZITHROMYCIN 500 MG/1
500 TABLET, FILM COATED ORAL DAILY
Qty: 3 TABLET | Refills: 0 | Status: SHIPPED | OUTPATIENT
Start: 2021-01-01 | End: 2021-01-01

## 2021-01-01 RX ORDER — LANOLIN ALCOHOL/MO/W.PET/CERES
325 CREAM (GRAM) TOPICAL
Qty: 30 TABLET | Refills: 0 | Status: SHIPPED | OUTPATIENT
Start: 2021-01-01 | End: 2021-01-01

## 2021-01-01 RX ORDER — OMEPRAZOLE 40 MG/1
40 CAPSULE, DELAYED RELEASE ORAL DAILY
Qty: 20 CAP | Refills: 0 | Status: SHIPPED | OUTPATIENT
Start: 2021-01-01 | End: 2021-01-01

## 2021-01-01 RX ORDER — BETAMETHASONE DIPROPIONATE 0.5 MG/G
CREAM TOPICAL
COMMUNITY
Start: 2020-01-01 | End: 2021-01-01

## 2021-01-01 RX ORDER — IBUPROFEN 200 MG
1 TABLET ORAL EVERY 24 HOURS
Qty: 30 PATCH | Refills: 0 | Status: SHIPPED | OUTPATIENT
Start: 2021-01-01 | End: 2021-01-01

## 2021-01-01 RX ORDER — FENTANYL 50 UG/1
1 PATCH TRANSDERMAL
Status: DISCONTINUED | OUTPATIENT
Start: 2021-01-01 | End: 2021-01-01 | Stop reason: HOSPADM

## 2021-01-01 RX ORDER — DEXAMETHASONE SODIUM PHOSPHATE 4 MG/ML
INJECTION, SOLUTION INTRA-ARTICULAR; INTRALESIONAL; INTRAMUSCULAR; INTRAVENOUS; SOFT TISSUE AS NEEDED
Status: DISCONTINUED | OUTPATIENT
Start: 2021-01-01 | End: 2021-01-01 | Stop reason: HOSPADM

## 2021-01-01 RX ORDER — LEVOFLOXACIN 5 MG/ML
750 INJECTION, SOLUTION INTRAVENOUS EVERY 24 HOURS
Status: DISCONTINUED | OUTPATIENT
Start: 2021-01-01 | End: 2021-01-01 | Stop reason: HOSPADM

## 2021-01-01 RX ORDER — IPRATROPIUM BROMIDE AND ALBUTEROL SULFATE 2.5; .5 MG/3ML; MG/3ML
3 SOLUTION RESPIRATORY (INHALATION)
Status: DISCONTINUED | OUTPATIENT
Start: 2021-01-01 | End: 2021-01-01 | Stop reason: HOSPADM

## 2021-01-01 RX ORDER — PREDNISONE 10 MG/1
10 TABLET ORAL DAILY
COMMUNITY
Start: 2021-01-01 | End: 2021-01-01

## 2021-01-01 RX ORDER — MORPHINE SULFATE 4 MG/ML
4 INJECTION INTRAVENOUS
Status: COMPLETED | OUTPATIENT
Start: 2021-01-01 | End: 2021-01-01

## 2021-01-01 RX ORDER — NICOTINE 7MG/24HR
1 PATCH, TRANSDERMAL 24 HOURS TRANSDERMAL EVERY 24 HOURS
Qty: 30 PATCH | Refills: 0 | Status: SHIPPED | OUTPATIENT
Start: 2021-01-01 | End: 2021-01-01

## 2021-01-01 RX ORDER — IPRATROPIUM BROMIDE AND ALBUTEROL SULFATE 2.5; .5 MG/3ML; MG/3ML
3 SOLUTION RESPIRATORY (INHALATION)
Status: COMPLETED | OUTPATIENT
Start: 2021-01-01 | End: 2021-01-01

## 2021-01-01 RX ORDER — DM/P-EPHED/ACETAMINOPH/DOXYLAM 30-7.5/3
2 LIQUID (ML) ORAL
Qty: 300 LOZENGE | Refills: 2 | Status: SHIPPED | OUTPATIENT
Start: 2021-01-01 | End: 2021-01-01

## 2021-01-01 RX ORDER — LABETALOL 100 MG/1
100 TABLET, FILM COATED ORAL 2 TIMES DAILY
Status: DISCONTINUED | OUTPATIENT
Start: 2021-01-01 | End: 2021-01-01 | Stop reason: HOSPADM

## 2021-01-01 RX ORDER — NEOSTIGMINE METHYLSULFATE 1 MG/ML
INJECTION, SOLUTION INTRAVENOUS AS NEEDED
Status: DISCONTINUED | OUTPATIENT
Start: 2021-01-01 | End: 2021-01-01 | Stop reason: HOSPADM

## 2021-01-01 RX ORDER — ONDANSETRON 4 MG/1
4 TABLET, ORALLY DISINTEGRATING ORAL
Status: DISCONTINUED | OUTPATIENT
Start: 2021-01-01 | End: 2021-01-01 | Stop reason: HOSPADM

## 2021-01-01 RX ORDER — SODIUM CHLORIDE 0.9 % (FLUSH) 0.9 %
5-40 SYRINGE (ML) INJECTION EVERY 8 HOURS
Status: DISCONTINUED | OUTPATIENT
Start: 2021-01-01 | End: 2021-01-01 | Stop reason: HOSPADM

## 2021-01-01 RX ORDER — SODIUM CHLORIDE, SODIUM LACTATE, POTASSIUM CHLORIDE, CALCIUM CHLORIDE 600; 310; 30; 20 MG/100ML; MG/100ML; MG/100ML; MG/100ML
50 INJECTION, SOLUTION INTRAVENOUS ONCE
Status: COMPLETED | OUTPATIENT
Start: 2021-01-01 | End: 2021-01-01

## 2021-01-01 RX ORDER — MAGNESIUM SULFATE HEPTAHYDRATE 40 MG/ML
2 INJECTION, SOLUTION INTRAVENOUS ONCE
Status: COMPLETED | OUTPATIENT
Start: 2021-01-01 | End: 2021-01-01

## 2021-01-01 RX ORDER — FAMOTIDINE 20 MG/1
20 TABLET, FILM COATED ORAL 2 TIMES DAILY
Qty: 30 TABLET | Refills: 0 | Status: SHIPPED | OUTPATIENT
Start: 2021-01-01 | End: 2021-01-01

## 2021-01-01 RX ORDER — FLUTICASONE FUROATE, UMECLIDINIUM BROMIDE AND VILANTEROL TRIFENATATE 200; 62.5; 25 UG/1; UG/1; UG/1
1 POWDER RESPIRATORY (INHALATION) DAILY
Qty: 3 INHALER | Refills: 3 | Status: SHIPPED | OUTPATIENT
Start: 2021-01-01 | End: 2021-01-01

## 2021-01-01 RX ORDER — DEXTROSE 50 % IN WATER (D50W) INTRAVENOUS SYRINGE
25-50 AS NEEDED
Status: DISCONTINUED | OUTPATIENT
Start: 2021-01-01 | End: 2021-01-01 | Stop reason: HOSPADM

## 2021-01-01 RX ORDER — SODIUM CHLORIDE 0.9 % (FLUSH) 0.9 %
5-40 SYRINGE (ML) INJECTION AS NEEDED
Status: DISCONTINUED | OUTPATIENT
Start: 2021-01-01 | End: 2021-01-01 | Stop reason: HOSPADM

## 2021-01-01 RX ORDER — LIDOCAINE 50 MG/G
OINTMENT TOPICAL
COMMUNITY
Start: 2020-01-01 | End: 2021-01-01

## 2021-01-01 RX ORDER — ALBUTEROL SULFATE 90 UG/1
1-2 AEROSOL, METERED RESPIRATORY (INHALATION)
Qty: 1 INHALER | Refills: 5 | Status: SHIPPED | OUTPATIENT
Start: 2021-01-01 | End: 2021-01-01

## 2021-01-01 RX ORDER — SODIUM CHLORIDE FOR INHALATION 3 %
4 VIAL, NEBULIZER (ML) INHALATION 2 TIMES DAILY
Status: DISCONTINUED | OUTPATIENT
Start: 2021-01-01 | End: 2021-01-01 | Stop reason: HOSPADM

## 2021-01-01 RX ORDER — CYCLOBENZAPRINE HCL 10 MG
10 TABLET ORAL
COMMUNITY
Start: 2021-01-01 | End: 2021-01-01

## 2021-01-01 RX ORDER — ROCURONIUM BROMIDE 10 MG/ML
INJECTION, SOLUTION INTRAVENOUS AS NEEDED
Status: DISCONTINUED | OUTPATIENT
Start: 2021-01-01 | End: 2021-01-01 | Stop reason: HOSPADM

## 2021-01-01 RX ORDER — DOCUSATE SODIUM 100 MG/1
100 CAPSULE, LIQUID FILLED ORAL 2 TIMES DAILY
Qty: 60 CAPSULE | Refills: 0 | Status: SHIPPED | OUTPATIENT
Start: 2021-01-01 | End: 2021-07-13

## 2021-01-01 RX ORDER — BUDESONIDE 0.5 MG/2ML
500 INHALANT ORAL
Status: DISCONTINUED | OUTPATIENT
Start: 2021-01-01 | End: 2021-01-01 | Stop reason: HOSPADM

## 2021-01-01 RX ORDER — SODIUM CHLORIDE 9 MG/ML
250 INJECTION, SOLUTION INTRAVENOUS AS NEEDED
Status: DISCONTINUED | OUTPATIENT
Start: 2021-01-01 | End: 2021-01-01 | Stop reason: HOSPADM

## 2021-01-01 RX ORDER — CEFTRIAXONE 1 G/1
1 INJECTION, POWDER, FOR SOLUTION INTRAMUSCULAR; INTRAVENOUS
Status: DISCONTINUED | OUTPATIENT
Start: 2021-01-01 | End: 2021-01-01 | Stop reason: CLARIF

## 2021-01-01 RX ORDER — ACETAMINOPHEN 325 MG/1
650 TABLET ORAL
Status: DISCONTINUED | OUTPATIENT
Start: 2021-01-01 | End: 2021-01-01 | Stop reason: HOSPADM

## 2021-01-01 RX ORDER — FAMOTIDINE 20 MG/1
20 TABLET, FILM COATED ORAL ONCE
Status: DISCONTINUED | OUTPATIENT
Start: 2021-01-01 | End: 2021-01-01

## 2021-01-01 RX ORDER — FENTANYL CITRATE 50 UG/ML
50 INJECTION, SOLUTION INTRAMUSCULAR; INTRAVENOUS
Status: COMPLETED | OUTPATIENT
Start: 2021-01-01 | End: 2021-01-01

## 2021-01-01 RX ORDER — GLYCOPYRROLATE 0.2 MG/ML
INJECTION INTRAMUSCULAR; INTRAVENOUS AS NEEDED
Status: DISCONTINUED | OUTPATIENT
Start: 2021-01-01 | End: 2021-01-01 | Stop reason: HOSPADM

## 2021-01-01 RX ORDER — MAGNESIUM SULFATE 100 %
16 CRYSTALS MISCELLANEOUS AS NEEDED
Status: DISCONTINUED | OUTPATIENT
Start: 2021-01-01 | End: 2021-01-01 | Stop reason: HOSPADM

## 2021-01-01 RX ORDER — OXYCODONE HYDROCHLORIDE 10 MG/1
10 TABLET ORAL
Qty: 30 TABLET | Refills: 0 | Status: SHIPPED | OUTPATIENT
Start: 2021-01-01 | End: 2021-01-01

## 2021-01-01 RX ORDER — POLYETHYLENE GLYCOL 3350 17 G/17G
17 POWDER, FOR SOLUTION ORAL DAILY PRN
Status: DISCONTINUED | OUTPATIENT
Start: 2021-01-01 | End: 2021-01-01 | Stop reason: HOSPADM

## 2021-01-01 RX ORDER — THERA TABS 400 MCG
1 TAB ORAL DAILY
Status: DISCONTINUED | OUTPATIENT
Start: 2021-01-01 | End: 2021-01-01 | Stop reason: HOSPADM

## 2021-01-01 RX ORDER — CYCLOBENZAPRINE HCL 10 MG
10 TABLET ORAL
Status: COMPLETED | OUTPATIENT
Start: 2021-01-01 | End: 2021-01-01

## 2021-01-01 RX ORDER — VANCOMYCIN/0.9 % SOD CHLORIDE 1.5G/250ML
1500 PLASTIC BAG, INJECTION (ML) INTRAVENOUS ONCE
Status: COMPLETED | OUTPATIENT
Start: 2021-01-01 | End: 2021-01-01

## 2021-01-01 RX ORDER — ASPIRIN 81 MG/1
TABLET ORAL DAILY
COMMUNITY
End: 2021-01-01

## 2021-01-01 RX ORDER — HYDROCODONE BITARTRATE AND ACETAMINOPHEN 5; 325 MG/1; MG/1
1 TABLET ORAL
COMMUNITY
End: 2021-01-01

## 2021-01-01 RX ORDER — INSULIN LISPRO 100 [IU]/ML
INJECTION, SOLUTION INTRAVENOUS; SUBCUTANEOUS
Status: DISCONTINUED | OUTPATIENT
Start: 2021-01-01 | End: 2021-01-01 | Stop reason: HOSPADM

## 2021-01-01 RX ORDER — OXYCODONE HYDROCHLORIDE 10 MG/1
10 TABLET ORAL
COMMUNITY
End: 2021-01-01 | Stop reason: SDUPTHER

## 2021-01-01 RX ORDER — SODIUM CHLORIDE, SODIUM LACTATE, POTASSIUM CHLORIDE, CALCIUM CHLORIDE 600; 310; 30; 20 MG/100ML; MG/100ML; MG/100ML; MG/100ML
50 INJECTION, SOLUTION INTRAVENOUS CONTINUOUS
Status: DISCONTINUED | OUTPATIENT
Start: 2021-01-01 | End: 2021-01-01

## 2021-01-01 RX ORDER — ACETAMINOPHEN 650 MG/1
650 SUPPOSITORY RECTAL
Status: DISCONTINUED | OUTPATIENT
Start: 2021-01-01 | End: 2021-01-01 | Stop reason: HOSPADM

## 2021-01-01 RX ORDER — ONDANSETRON 2 MG/ML
INJECTION INTRAMUSCULAR; INTRAVENOUS AS NEEDED
Status: DISCONTINUED | OUTPATIENT
Start: 2021-01-01 | End: 2021-01-01 | Stop reason: HOSPADM

## 2021-01-01 RX ORDER — FENTANYL 50 UG/1
1 PATCH TRANSDERMAL
Status: DISCONTINUED | OUTPATIENT
Start: 2021-01-01 | End: 2021-01-01 | Stop reason: SDUPTHER

## 2021-01-01 RX ORDER — MORPHINE SULFATE 10 MG/1
3 CAPSULE, EXTENDED RELEASE ORAL 2 TIMES DAILY
COMMUNITY
End: 2021-01-01

## 2021-01-01 RX ORDER — ONDANSETRON 2 MG/ML
4 INJECTION INTRAMUSCULAR; INTRAVENOUS
Status: DISCONTINUED | OUTPATIENT
Start: 2021-01-01 | End: 2021-01-01 | Stop reason: HOSPADM

## 2021-01-01 RX ORDER — SODIUM CHLORIDE, SODIUM LACTATE, POTASSIUM CHLORIDE, CALCIUM CHLORIDE 600; 310; 30; 20 MG/100ML; MG/100ML; MG/100ML; MG/100ML
INJECTION, SOLUTION INTRAVENOUS
Status: DISCONTINUED | OUTPATIENT
Start: 2021-01-01 | End: 2021-01-01 | Stop reason: HOSPADM

## 2021-01-01 RX ORDER — MORPHINE SULFATE 30 MG/1
30 TABLET, FILM COATED, EXTENDED RELEASE ORAL EVERY 12 HOURS
Status: DISCONTINUED | OUTPATIENT
Start: 2021-01-01 | End: 2021-01-01 | Stop reason: HOSPADM

## 2021-01-01 RX ORDER — CLINDAMYCIN HYDROCHLORIDE 300 MG/1
300 CAPSULE ORAL 4 TIMES DAILY
Qty: 40 CAPSULE | Refills: 0 | Status: SHIPPED | OUTPATIENT
Start: 2021-01-01 | End: 2021-01-01

## 2021-01-01 RX ORDER — BENZONATATE 100 MG/1
100 CAPSULE ORAL ONCE
Status: COMPLETED | OUTPATIENT
Start: 2021-01-01 | End: 2021-01-01

## 2021-01-01 RX ORDER — BENZONATATE 100 MG/1
100 CAPSULE ORAL
COMMUNITY
End: 2021-01-01

## 2021-01-01 RX ORDER — OXYCODONE HYDROCHLORIDE 5 MG/1
5 CAPSULE ORAL
Status: ON HOLD | COMMUNITY
End: 2021-01-01 | Stop reason: CLARIF

## 2021-01-01 RX ORDER — MIDAZOLAM HYDROCHLORIDE 1 MG/ML
INJECTION, SOLUTION INTRAMUSCULAR; INTRAVENOUS AS NEEDED
Status: DISCONTINUED | OUTPATIENT
Start: 2021-01-01 | End: 2021-01-01 | Stop reason: HOSPADM

## 2021-01-01 RX ORDER — GUAIFENESIN 100 MG/5ML
200 SOLUTION ORAL
COMMUNITY
End: 2021-01-01

## 2021-01-01 RX ORDER — FAMOTIDINE 20 MG/1
20 TABLET, FILM COATED ORAL 2 TIMES DAILY
Status: CANCELLED | OUTPATIENT
Start: 2021-01-01

## 2021-01-01 RX ORDER — LABETALOL 100 MG/1
100 TABLET, FILM COATED ORAL 2 TIMES DAILY
COMMUNITY
End: 2021-01-01

## 2021-01-01 RX ORDER — SODIUM CHLORIDE, SODIUM LACTATE, POTASSIUM CHLORIDE, CALCIUM CHLORIDE 600; 310; 30; 20 MG/100ML; MG/100ML; MG/100ML; MG/100ML
50 INJECTION, SOLUTION INTRAVENOUS CONTINUOUS
Status: DISCONTINUED | OUTPATIENT
Start: 2021-01-01 | End: 2021-01-01 | Stop reason: HOSPADM

## 2021-01-01 RX ADMIN — FUROSEMIDE 40 MG: 10 INJECTION, SOLUTION INTRAMUSCULAR; INTRAVENOUS at 22:45

## 2021-01-01 RX ADMIN — OXYCODONE HYDROCHLORIDE 10 MG: 5 TABLET ORAL at 21:03

## 2021-01-01 RX ADMIN — SODIUM CHLORIDE SOLN NEBU 3% 4 ML: 3 NEBU SOLN at 14:04

## 2021-01-01 RX ADMIN — IOPAMIDOL 75 ML: 755 INJECTION, SOLUTION INTRAVENOUS at 18:11

## 2021-01-01 RX ADMIN — IPRATROPIUM BROMIDE AND ALBUTEROL SULFATE 3 ML: .5; 3 SOLUTION RESPIRATORY (INHALATION) at 00:35

## 2021-01-01 RX ADMIN — IPRATROPIUM BROMIDE AND ALBUTEROL SULFATE 3 ML: .5; 3 SOLUTION RESPIRATORY (INHALATION) at 13:49

## 2021-01-01 RX ADMIN — FENTANYL CITRATE 50 MCG: 50 INJECTION, SOLUTION INTRAMUSCULAR; INTRAVENOUS at 13:05

## 2021-01-01 RX ADMIN — CYCLOBENZAPRINE 10 MG: 10 TABLET, FILM COATED ORAL at 20:18

## 2021-01-01 RX ADMIN — IPRATROPIUM BROMIDE AND ALBUTEROL SULFATE 3 ML: .5; 3 SOLUTION RESPIRATORY (INHALATION) at 14:04

## 2021-01-01 RX ADMIN — FAMOTIDINE 20 MG: 20 TABLET ORAL at 17:11

## 2021-01-01 RX ADMIN — CYCLOBENZAPRINE 10 MG: 10 TABLET, FILM COATED ORAL at 22:48

## 2021-01-01 RX ADMIN — FERROUS SULFATE TAB 325 MG (65 MG ELEMENTAL FE) 325 MG: 325 (65 FE) TAB at 10:53

## 2021-01-01 RX ADMIN — MORPHINE SULFATE 30 MG: 30 TABLET, FILM COATED, EXTENDED RELEASE ORAL at 00:46

## 2021-01-01 RX ADMIN — ONDANSETRON 4 MG: 2 INJECTION INTRAMUSCULAR; INTRAVENOUS at 13:27

## 2021-01-01 RX ADMIN — METHYLPREDNISOLONE SODIUM SUCCINATE 40 MG: 40 INJECTION, POWDER, FOR SOLUTION INTRAMUSCULAR; INTRAVENOUS at 13:31

## 2021-01-01 RX ADMIN — VANCOMYCIN HYDROCHLORIDE 1000 MG: 1 INJECTION, POWDER, LYOPHILIZED, FOR SOLUTION INTRAVENOUS at 14:28

## 2021-01-01 RX ADMIN — LABETALOL HYDROCHLORIDE 100 MG: 100 TABLET, FILM COATED ORAL at 10:53

## 2021-01-01 RX ADMIN — OXYCODONE HYDROCHLORIDE 10 MG: 5 TABLET ORAL at 22:45

## 2021-01-01 RX ADMIN — IOPAMIDOL 100 ML: 612 INJECTION, SOLUTION INTRAVENOUS at 13:50

## 2021-01-01 RX ADMIN — OXYCODONE HYDROCHLORIDE 10 MG: 5 TABLET ORAL at 05:21

## 2021-01-01 RX ADMIN — OXYCODONE HYDROCHLORIDE 5 MG: 5 TABLET ORAL at 20:18

## 2021-01-01 RX ADMIN — Medication 10 ML: at 22:49

## 2021-01-01 RX ADMIN — GUAIFENESIN 200 MG: 200 SOLUTION ORAL at 22:46

## 2021-01-01 RX ADMIN — Medication 10 ML: at 06:32

## 2021-01-01 RX ADMIN — INSULIN LISPRO 2 UNITS: 100 INJECTION, SOLUTION INTRAVENOUS; SUBCUTANEOUS at 14:33

## 2021-01-01 RX ADMIN — SODIUM CHLORIDE 109 ML: 900 INJECTION, SOLUTION INTRAVENOUS at 13:12

## 2021-01-01 RX ADMIN — METHYLPREDNISOLONE SODIUM SUCCINATE 40 MG: 40 INJECTION, POWDER, FOR SOLUTION INTRAMUSCULAR; INTRAVENOUS at 05:12

## 2021-01-01 RX ADMIN — SODIUM CHLORIDE 1000 ML: 900 INJECTION, SOLUTION INTRAVENOUS at 12:46

## 2021-01-01 RX ADMIN — SODIUM CHLORIDE, SODIUM LACTATE, POTASSIUM CHLORIDE, AND CALCIUM CHLORIDE 50 ML/HR: 600; 310; 30; 20 INJECTION, SOLUTION INTRAVENOUS at 10:12

## 2021-01-01 RX ADMIN — MORPHINE SULFATE 30 MG: 30 TABLET, FILM COATED, EXTENDED RELEASE ORAL at 21:03

## 2021-01-01 RX ADMIN — LEVOFLOXACIN 750 MG: 5 INJECTION, SOLUTION INTRAVENOUS at 12:54

## 2021-01-01 RX ADMIN — IOPAMIDOL 100 ML: 755 INJECTION, SOLUTION INTRAVENOUS at 15:35

## 2021-01-01 RX ADMIN — OXYCODONE HYDROCHLORIDE 10 MG: 5 TABLET ORAL at 14:33

## 2021-01-01 RX ADMIN — THERA TABS 1 TABLET: TAB at 10:53

## 2021-01-01 RX ADMIN — LABETALOL HYDROCHLORIDE 100 MG: 100 TABLET, FILM COATED ORAL at 09:17

## 2021-01-01 RX ADMIN — MORPHINE SULFATE 30 MG: 30 TABLET, FILM COATED, EXTENDED RELEASE ORAL at 10:53

## 2021-01-01 RX ADMIN — SODIUM CHLORIDE, SODIUM LACTATE, POTASSIUM CHLORIDE, AND CALCIUM CHLORIDE: 600; 310; 30; 20 INJECTION, SOLUTION INTRAVENOUS at 11:12

## 2021-01-01 RX ADMIN — IPRATROPIUM BROMIDE AND ALBUTEROL SULFATE 3 ML: .5; 3 SOLUTION RESPIRATORY (INHALATION) at 13:45

## 2021-01-01 RX ADMIN — METHYLPREDNISOLONE SODIUM SUCCINATE 40 MG: 40 INJECTION, POWDER, FOR SOLUTION INTRAMUSCULAR; INTRAVENOUS at 05:40

## 2021-01-01 RX ADMIN — INSULIN LISPRO 2 UNITS: 100 INJECTION, SOLUTION INTRAVENOUS; SUBCUTANEOUS at 17:13

## 2021-01-01 RX ADMIN — OXYCODONE HYDROCHLORIDE 10 MG: 5 TABLET ORAL at 05:11

## 2021-01-01 RX ADMIN — GLYCOPYRROLATE 0.4 MG: 0.2 INJECTION, SOLUTION INTRAMUSCULAR; INTRAVENOUS at 12:50

## 2021-01-01 RX ADMIN — METHYLPREDNISOLONE SODIUM SUCCINATE 40 MG: 40 INJECTION, POWDER, FOR SOLUTION INTRAMUSCULAR; INTRAVENOUS at 22:45

## 2021-01-01 RX ADMIN — MORPHINE SULFATE 20 MG: 20 SOLUTION ORAL at 11:18

## 2021-01-01 RX ADMIN — Medication 10 ML: at 21:21

## 2021-01-01 RX ADMIN — INSULIN LISPRO 2 UNITS: 100 INJECTION, SOLUTION INTRAVENOUS; SUBCUTANEOUS at 09:19

## 2021-01-01 RX ADMIN — FAMOTIDINE 20 MG: 20 TABLET ORAL at 10:12

## 2021-01-01 RX ADMIN — IPRATROPIUM BROMIDE AND ALBUTEROL SULFATE 3 ML: .5; 3 SOLUTION RESPIRATORY (INHALATION) at 09:36

## 2021-01-01 RX ADMIN — SODIUM CHLORIDE SOLN NEBU 3% 4 ML: 3 NEBU SOLN at 09:37

## 2021-01-01 RX ADMIN — IPRATROPIUM BROMIDE AND ALBUTEROL SULFATE 3 ML: .5; 3 SOLUTION RESPIRATORY (INHALATION) at 07:56

## 2021-01-01 RX ADMIN — AZITHROMYCIN 500 MG: 500 INJECTION, POWDER, LYOPHILIZED, FOR SOLUTION INTRAVENOUS at 21:04

## 2021-01-01 RX ADMIN — IPRATROPIUM BROMIDE AND ALBUTEROL SULFATE 3 ML: .5; 3 SOLUTION RESPIRATORY (INHALATION) at 21:24

## 2021-01-01 RX ADMIN — LABETALOL HYDROCHLORIDE 100 MG: 100 TABLET, FILM COATED ORAL at 17:11

## 2021-01-01 RX ADMIN — AZITHROMYCIN 500 MG: 500 INJECTION, POWDER, LYOPHILIZED, FOR SOLUTION INTRAVENOUS at 22:41

## 2021-01-01 RX ADMIN — SODIUM CHLORIDE SOLN NEBU 3% 4 ML: 3 NEBU SOLN at 17:29

## 2021-01-01 RX ADMIN — Medication 3 MG: at 12:50

## 2021-01-01 RX ADMIN — WATER 1 G: 1 INJECTION INTRAMUSCULAR; INTRAVENOUS; SUBCUTANEOUS at 22:45

## 2021-01-01 RX ADMIN — FAMOTIDINE 20 MG: 20 TABLET ORAL at 10:54

## 2021-01-01 RX ADMIN — IPRATROPIUM BROMIDE AND ALBUTEROL SULFATE 3 ML: .5; 3 SOLUTION RESPIRATORY (INHALATION) at 01:41

## 2021-01-01 RX ADMIN — FENTANYL CITRATE 25 MCG: 50 INJECTION, SOLUTION INTRAMUSCULAR; INTRAVENOUS at 08:12

## 2021-01-01 RX ADMIN — METHYLPREDNISOLONE SODIUM SUCCINATE 40 MG: 40 INJECTION, POWDER, FOR SOLUTION INTRAMUSCULAR; INTRAVENOUS at 21:04

## 2021-01-01 RX ADMIN — CEFEPIME 2 G: 2 INJECTION, POWDER, FOR SOLUTION INTRAVENOUS at 13:39

## 2021-01-01 RX ADMIN — SODIUM CHLORIDE 478 ML: 900 INJECTION, SOLUTION INTRAVENOUS at 15:37

## 2021-01-01 RX ADMIN — OXYCODONE HYDROCHLORIDE 10 MG: 5 TABLET ORAL at 17:11

## 2021-01-01 RX ADMIN — Medication 10 ML: at 05:41

## 2021-01-01 RX ADMIN — VANCOMYCIN HYDROCHLORIDE 750 MG: 750 INJECTION, POWDER, LYOPHILIZED, FOR SOLUTION INTRAVENOUS at 16:18

## 2021-01-01 RX ADMIN — DEXAMETHASONE SODIUM PHOSPHATE 8 MG: 4 INJECTION, SOLUTION INTRAMUSCULAR; INTRAVENOUS at 11:44

## 2021-01-01 RX ADMIN — SODIUM CHLORIDE 1000 ML: 900 INJECTION, SOLUTION INTRAVENOUS at 12:47

## 2021-01-01 RX ADMIN — INSULIN LISPRO 4 UNITS: 100 INJECTION, SOLUTION INTRAVENOUS; SUBCUTANEOUS at 23:12

## 2021-01-01 RX ADMIN — BUDESONIDE 500 MCG: 0.5 SUSPENSION RESPIRATORY (INHALATION) at 09:37

## 2021-01-01 RX ADMIN — INSULIN LISPRO 2 UNITS: 100 INJECTION, SOLUTION INTRAVENOUS; SUBCUTANEOUS at 10:52

## 2021-01-01 RX ADMIN — BENZONATATE 100 MG: 100 CAPSULE ORAL at 05:21

## 2021-01-01 RX ADMIN — ONDANSETRON 4 MG: 2 INJECTION INTRAMUSCULAR; INTRAVENOUS at 12:39

## 2021-01-01 RX ADMIN — FENTANYL 1 PATCH: 100 PATCH TRANSDERMAL at 11:17

## 2021-01-01 RX ADMIN — MORPHINE SULFATE 30 MG: 30 TABLET, FILM COATED, EXTENDED RELEASE ORAL at 09:18

## 2021-01-01 RX ADMIN — INSULIN LISPRO 2 UNITS: 100 INJECTION, SOLUTION INTRAVENOUS; SUBCUTANEOUS at 21:20

## 2021-01-01 RX ADMIN — OXYCODONE HYDROCHLORIDE 10 MG: 5 TABLET ORAL at 00:36

## 2021-01-01 RX ADMIN — METHYLPREDNISOLONE SODIUM SUCCINATE 125 MG: 125 INJECTION, POWDER, FOR SOLUTION INTRAMUSCULAR; INTRAVENOUS at 13:36

## 2021-01-01 RX ADMIN — SODIUM CHLORIDE 1000 ML: 900 INJECTION, SOLUTION INTRAVENOUS at 14:24

## 2021-01-01 RX ADMIN — BUDESONIDE 500 MCG: 0.5 SUSPENSION RESPIRATORY (INHALATION) at 20:19

## 2021-01-01 RX ADMIN — CYCLOBENZAPRINE 10 MG: 10 TABLET, FILM COATED ORAL at 21:03

## 2021-01-01 RX ADMIN — Medication 10 ML: at 15:17

## 2021-01-01 RX ADMIN — IOPAMIDOL 80 ML: 612 INJECTION, SOLUTION INTRAVENOUS at 08:04

## 2021-01-01 RX ADMIN — MIDAZOLAM HYDROCHLORIDE 2 MG: 2 INJECTION, SOLUTION INTRAMUSCULAR; INTRAVENOUS at 11:12

## 2021-01-01 RX ADMIN — MORPHINE SULFATE 4 MG: 4 INJECTION, SOLUTION INTRAMUSCULAR; INTRAVENOUS at 13:29

## 2021-01-01 RX ADMIN — SODIUM CHLORIDE, SODIUM LACTATE, POTASSIUM CHLORIDE, AND CALCIUM CHLORIDE: 600; 310; 30; 20 INJECTION, SOLUTION INTRAVENOUS at 12:17

## 2021-01-01 RX ADMIN — BUDESONIDE 500 MCG: 0.5 SUSPENSION RESPIRATORY (INHALATION) at 07:56

## 2021-01-01 RX ADMIN — VANCOMYCIN HYDROCHLORIDE 1500 MG: 10 INJECTION, POWDER, LYOPHILIZED, FOR SOLUTION INTRAVENOUS at 16:01

## 2021-01-01 RX ADMIN — MAGNESIUM SULFATE HEPTAHYDRATE 2 G: 2 INJECTION, SOLUTION INTRAVENOUS at 13:43

## 2021-01-01 RX ADMIN — CLOPIDOGREL BISULFATE 75 MG: 75 TABLET ORAL at 10:53

## 2021-01-01 RX ADMIN — THERA TABS 1 TABLET: TAB at 09:18

## 2021-01-01 RX ADMIN — FENTANYL CITRATE 100 MCG: 50 INJECTION, SOLUTION INTRAMUSCULAR; INTRAVENOUS at 11:33

## 2021-01-01 RX ADMIN — ROCURONIUM BROMIDE 30 MG: 50 INJECTION INTRAVENOUS at 11:39

## 2021-01-01 RX ADMIN — IPRATROPIUM BROMIDE AND ALBUTEROL SULFATE 3 ML: .5; 3 SOLUTION RESPIRATORY (INHALATION) at 20:19

## 2021-01-01 RX ADMIN — OXYCODONE HYDROCHLORIDE 10 MG: 5 TABLET ORAL at 11:52

## 2021-01-01 RX ADMIN — DOCUSATE SODIUM 100 MG: 100 CAPSULE ORAL at 10:54

## 2021-01-01 RX ADMIN — PIPERACILLIN AND TAZOBACTAM 4.5 G: 4; .5 INJECTION, POWDER, FOR SOLUTION INTRAVENOUS at 12:47

## 2021-01-01 RX ADMIN — FENTANYL CITRATE 50 MCG: 50 INJECTION, SOLUTION INTRAMUSCULAR; INTRAVENOUS at 16:05

## 2021-01-01 RX ADMIN — FERROUS SULFATE TAB 325 MG (65 MG ELEMENTAL FE) 325 MG: 325 (65 FE) TAB at 09:17

## 2021-01-01 RX ADMIN — LORAZEPAM 1 ML: 2 CONCENTRATE ORAL at 11:24

## 2021-01-01 RX ADMIN — INSULIN LISPRO 2 UNITS: 100 INJECTION, SOLUTION INTRAVENOUS; SUBCUTANEOUS at 11:39

## 2021-01-01 RX ADMIN — SODIUM CHLORIDE 1000 ML: 900 INJECTION, SOLUTION INTRAVENOUS at 13:33

## 2021-01-01 RX ADMIN — DOCUSATE SODIUM 100 MG: 100 CAPSULE ORAL at 17:11

## 2021-02-16 NOTE — PROGRESS NOTES
Isidra Singh presents today for Chief Complaint Patient presents with  Referral / Consult  
  referred by QUENTIN Sesay for abnormal CT 2/10/2021  Results COVID (-) 12/26/2020 Is someone accompanying this pt? No 
 
Is the patient using any DME equipment during OV? No  
 -DME Company N/A Depression Screening: 
3 most recent PHQ Screens 2/16/2021 Little interest or pleasure in doing things Not at all Feeling down, depressed, irritable, or hopeless Not at all Total Score PHQ 2 0 Learning Assessment: 
Learning Assessment 2/16/2021 PRIMARY LEARNER Patient CO-LEARNER CAREGIVER -  
PRIMARY LANGUAGE ENGLISH  
LEARNER PREFERENCE PRIMARY LISTENING  
ANSWERED BY Patient RELATIONSHIP SELF Abuse Screening: No flowsheet data found. Fall Risk Fall Risk Assessment, last 12 mths 2/16/2021 Able to walk? Yes Fall in past 12 months? 0 Do you feel unsteady? 0 Are you worried about falling 0 Coordination of Care: 1. Have you been to the ER, urgent care clinic since your last visit? Hospitalized since your last visit? No 
 
2. Have you seen or consulted any other health care providers outside of the [address] [place] [address] since your last visit? Include any pap smears or colon screening. Yes. QUENTIN Sesay, PCP and referring provider Influenza vaccine offered but declined by patient at this time.

## 2021-02-16 NOTE — LETTER
2/16/2021 Patient: Andreea Santamaria YOB: 1960 Date of Visit: 2/16/2021 Thomas Scott NP 
[address] [address] [address] Fax: [phone] Dear Thomas Scott NP, Thank you for referring Mr. Carla Singh to [address] [place] [address] for evaluation. My notes for this consultation are attached. If you have questions, please do not hesitate to call me. I look forward to following your patient along with you. Sincerely, Joesph Oliveira MD

## 2021-02-16 NOTE — PROGRESS NOTES
[address] [place] 
[phone] [facility] Pulmonary, Critical Care, and Sleep Medicine Pulmonary Office Initial Consultation Name: Breann Bronson 56 y.o. male MRN: [id number] : 1960 Service Date: 21 Referring Provider: QUENTIN Hodges [address] [place],  [address] [place], [place] [address]y [address] Chief Complaint:  
Chief Complaint Patient presents with  Referral / Consult  
  referred by QUENTIN Lyles for abnormal CT 2/10/2021  Results COVID (-) 2020 History of Present Illness: 
Breann Bronson is a 56 y.o. male, who presents to Pulmonary clinic referred for abnormal CT scan. Pt reports that he had a CXR done for worsening cough and B/L rib pain. Initially told it was an infection, received a course of ABx and steroids. Pt then had a CT which showed multiple lung lesions. Chronic cough -- worse in the night and mornings. Productive of non purulent sputum Back pain when he coughs. Pt reports associated severe B/L rib pain - 10/10, sharp, aching. He reports sx improved on prednisone and recurred after completing course. Pt reports sx since mid-end of November Sx persist -- wax and wane but remain significant Pt reports that he was prescribed another course of prednisone but hasn't taken it yet. Pt reports that he had a sample of trelegy -- reports some mild improvement with exertional dyspnea, not pain or cough. Dyspnea on mild to moderate exertion -- able to do about 1 flight of steps mMRC of 2 Sx alleviated with rest.  Also aggravated by heat and lifting things. Smoking Hx:  2PPD currently -- 3PPD max Occ Hx:   instructor, former . Former [employer] worker. No occupational exposures to coal/silica/asbestos Father had mesothelioma --- former [employer] and [employer] worker Past Medical Hx: I have personally reviewed medical hx Past Medical History: Diagnosis Date  Claudication in peripheral vascular disease ([facility] [address]  Hypertension  Peripheral vascular disease ([facility] [address]  Pure hypercholesterolemia Past Surgical Hx: I have personally reviewed surgical hx Past Surgical History:  
Procedure Laterality Date  HX ANGIOPLASTY Left 12/7/11  
 left leg  HX HEART CATHETERIZATION    
 HX KNEE ARTHROSCOPY  2/2008  HX OTHER SURGICAL  1982  
 jaw surgery Family Hx: I have personally reviewed the family hx. Family History Problem Relation Age of Onset  Hypertension Other  Heart Disease Other  Diabetes Other  Stroke Other  Cancer Father   
     lung Social Hx: I have personally reviewed the social hx. Social History Socioeconomic History  Marital status:  Spouse name: Not on file  Number of children: Not on file  Years of education: Not on file  Highest education level: Not on file Occupational History  Not on file Social Needs  Financial resource strain: Not on file  Food insecurity Worry: Not on file Inability: Not on file  Transportation needs Medical: Not on file Non-medical: Not on file Tobacco Use  Smoking status: Current Every Day Smoker Packs/day: 2.00 Years: 45.00 Pack years: 90.00  Smokeless tobacco: Never Used Substance and Sexual Activity  Alcohol use: Yes Alcohol/week: 17.5 standard drinks Types: 21 Cans of beer per week  Drug use: No  
 Sexual activity: Never Lifestyle  Physical activity Days per week: Not on file Minutes per session: Not on file  Stress: Not on file Relationships  Social connections Talks on phone: Not on file Gets together: Not on file Attends Alevism service: Not on file Active member of club or organization: Not on file Attends meetings of clubs or organizations: Not on file Relationship status: Not on file  Intimate partner violence Fear of current or ex partner: Not on file Emotionally abused: Not on file Physically abused: Not on file Forced sexual activity: Not on file Other Topics Concern  Not on file Social History Narrative  Not on file Allergies: I have reviewed the allergy hx No Known Allergies Medications:  I have reviewed the patient's medications Prior to Admission medications Medication Sig Start Date End Date Taking? Authorizing Provider  
cyclobenzaprine (FLEXERIL) 10 mg tablet Take 10 mg by mouth nightly. 2/13/21  Yes Provider, Historical  
predniSONE (DELTASONE) 10 mg tablet Take 10 mg by mouth daily. 2/13/21  Yes Provider, Historical  
HYDROcodone-acetaminophen (NORCO) 5-325 mg per tablet Take 1 Tab by mouth every eight (8) hours as needed for Pain. Yes Provider, Historical  
clopidogreL (PLAVIX) 75 mg tab TAKE 1 TABLET DAILY Patient taking differently: Take 75 mg by mouth daily. 2/5/20  Yes SAMI Palomino  
amLODIPine (NORVASC) 5 mg tablet TAKE 1 TABLET DAILY 2/5/20  Yes SAMI Brunner  
labetalol (NORMODYNE) 200 mg tablet TAKE 1 TABLET TWICE A DAY Patient taking differently: Take 200 mg by mouth two (2) times a day. 11/10/19  Yes Amina Spain MD  
multivitamin (ONE A DAY) tablet Take 1 Tab by mouth daily. Yes Provider, Historical  
augmented betamethasone dipropionate (DIPROLENE-AF) 0.05 % topical cream Apply  to affected area two (2) times daily as needed. 12/14/20   Provider, Historical  
calcipotriene (DOVONEX) 0.005 % topical cream Apply  to affected area two (2) times daily as needed. 12/9/20   Provider, Historical  
lidocaine (XYLOCAINE) 5 % ointment APPLY A PEA SIZE AMOUNT TO THE AFFECTED AREA(S) ON THE ARMS AS NEEDED TO HELP WITH [facility] 12/12/20   Provider, Historical  
 
 
Immunizations:  I have reviewed the patient's immunizations Immunization History Administered Date(s) Administered  TB Skin Test (PPD) Intradermal 02/15/2017, 02/08/2019  Td 08/29/2008 Review of Systems: A complete review of systems was performed as stated in the HPI, all others are negative. Objective: 
 
Physical Exam: 
BP (!) 148/79 (BP 1 Location: Right arm, BP Patient Position: Sitting, BP Cuff Size: Adult)   Pulse 74   Temp 98.2 °F (36.8 °C) (Oral)   Resp 18   Ht 5' 10\" (1.778 m)   Wt 85 kg (187 lb 6.4 oz)   SpO2 96%   BMI 26.89 kg/m² Vitals were personally reviewed Gen: no acute distress, pleasant and cooperative, sitting up in chair, able to climb to exam table w/o difficulty HEENT: normocephalic/atraumatic, no ocular drainage, EOMI, no scleral icterus, nasal bridge midline, unable to assess nasal and oral cavities due to patient wearing mask in the setting of COVID-19 pandemic Neck: supple, trachea midline, no JVD, no cervical and supraclavicular adenopathy CVS: regular rate rhythm, S1/S2, no murmurs/rubs/gallops Lungs: good air entry B/L, no wheezes/rales/rhonchi Ext: no pitting edema B/L, no peripheral cyanosis or clubbing Neuro: grossly normal, AAOx3, normal strength and coordination grossly, no focal deficits Skin: no rashes, erythema, lesions Psych: normal memory, thought content, and processing Labs: I have reviewed the patient's available labs Lab Results Component Value Date/Time WBC 7.2 12/28/2020 11:14 AM  
 HGB 15.8 12/28/2020 11:14 AM  
 HCT 45.5 12/28/2020 11:14 AM  
 PLATELET 188 78/30/2334 11:14 AM  
 MCV 95.2 12/28/2020 11:14 AM  
 
Lab Results Component Value Date/Time  Sodium 137 12/28/2020 11:14 AM  
 Potassium 4.7 12/28/2020 11:14 AM  
 Chloride 104 12/28/2020 11:14 AM  
 CO2 26 12/28/2020 11:14 AM  
 Anion gap 7 12/28/2020 11:14 AM  
 Glucose 95 12/28/2020 11:14 AM  
 BUN 4 (L) 12/28/2020 11:14 AM  
 Creatinine 0.64 12/28/2020 11:14 AM  
 BUN/Creatinine ratio 6 (L) 12/28/2020 11:14 AM  
 GFR est AA >60 12/28/2020 11:14 AM  
 GFR est non-AA >60 12/28/2020 11:14 AM  
 Calcium 9.0 12/28/2020 11:14 AM  
 Bilirubin, total 0.6 12/28/2020 11:14 AM  
 Alk. phosphatase 75 12/28/2020 11:14 AM  
 Protein, total 7.6 12/28/2020 11:14 AM  
 Albumin 3.9 12/28/2020 11:14 AM  
 Globulin 3.7 12/28/2020 11:14 AM  
 A-G Ratio 1.1 12/28/2020 11:14 AM  
 ALT (SGPT) 27 12/28/2020 11:14 AM  
 AST (SGOT) 23 12/28/2020 11:14 AM  
 
 
Outside records reviewed in clinic as follows: 
-Last note by PCP NP Sonja Diez, order placed for abnormal finding of the lung, 68-year-old male, long-term chronic smoker, abnormal CT results, nodule in left upper lateral lobe consistent with malignancy, recommend further evaluation and biopsy. Assistance in his care greatly appreciated, ASAP. Imaging:  I have personally reviewed patient's imaging as follows--CT chest with IV contrast from 2/10/2021 shows 2 left upper lobe cavitary nodules/masses along with mediastinal and hilar lymphadenopathy, very large right paratracheal lymph node approximately 3 cm in size, along with subcarinal and bilateral hilar lymphadenopathy. Patient also has bilateral centrilobular emphysematous changes, worse in upper lobes, but scattered throughout all lung fields. No other consolidations, infiltrates, effusions were seen. Official report per radiology: CT Results (most recent): 
Results from [facility] Encounter encounter on 02/10/21 CT CHEST W CONT Narrative EXAM: CT CHEST WITH IV CONTRAST CLINICAL INDICATION/HISTORY: Dyspnea. Smoking. COMPARISON: Chest radiograph 1/12/2021, CT chest 1/13/2015 TECHNIQUE: CT chest with 80 cc of Isovue-300 IV contrast. 
All CT scans at this facility are performed using dose optimization technique as 
appropriate to the performed examination, to include automated exposure control, 
adjustment of the mA and/or kV according to patient's size (including 
appropriate matching for site-specific examinations), or use of an iterative 
reconstruction technique.  
 
 
FINDINGS:  
 
 Lung/Airways: In the peripheral left upper lobe, there is an irregular 3.0 x 
1.7 cm lung nodule with some internal cavitation (3, 14). There are a few 
spiculations that extend to the lateral pleura and medially toward a second 
irregular nodule located medially in the left upper lobe. This nodule abuts the 
mediastinum and measures 1.8 x 1.6 cm, also with central cavitation and 
spiculation. These correspond to the abnormalities seen on recent prior chest 
radiographs and are new from 2015 study. In the right upper lobe, there are 2 areas of subtle reticulation, without 
discrete nodule, one posteriorly (3, 15) and the other anterior laterally (15). Recommend attention to these areas on follow-up 2 mm nodule left upper lobe (21). Additional subpleural left upper lobe nodule 
measuring 2 mm (36). 2 mm nodule right middle lobe (33) is along the minor fissure, likely an 
intrapulmonary lymph node. Additional small area of groundglass density along 
the fissure on axial images is flat on coronal and sagittal reconstructions, 
possibly an area of scarring. A few additional tiny nodules along the bilateral 
fissures are also likely intrapulmonary lymph nodes. Moderate emphysema. Base of Neck:  Unremarkable. Mediastinum: Large right paratracheal node measures 2.5 x 2.8 cm (2, 22). Right 
hilar node measures 1.5 x 2.0 cm. Multiple enlarged left hilar nodes, measuring 
up to 1.6 x 2.0 cm. A 2.0 x 0.7 cm subcarinal node noted, nonspecific. Heart: Moderate coronary artery calcifications. Vasculature: Moderate burden of calcified atherosclerotic disease. Upper Abdomen: Diffuse low-attenuation of the liver suggests steatosis, though 
this appearance may be exaggerated by contrast bolus timing. Linear area of 
calcification peripheral spleen. Few colonic diverticula. Soft tissues: Unremarkable. Bones: No acute osseous abnormality. Healed right lateral ninth rib fracture.  No 
 definite suspicious osseous lesions. Multilevel degenerative disc disease. Impression 1. Spiculated lateral left upper lobe nodule measuring 3.0 x 1.7 cm, with 
central cavitation, is consistent with malignancy. Recommend further evaluation 
with biopsy. 
-Smaller adjacent spiculated left upper lobe nodule measuring 1.8 x 1.6 cm, also 
with central cavitation, is likely metastatic 
-Additional tiny 2 mm left upper lobe nodules are nonspecific, recommend 
attention on follow-up. 2. Mediastinal and bilateral hilar lymphadenopathy, consistent with metastatic 
disease. 3. Moderate emphysema. 4. Additional findings as described above. PFTs:  None on file TTE:  I have reviewed the patient's TTE results No results found for this or any previous visit. Assessment and Plan: 
56 y.o. male with: 
 
Impression: 1. Abnormal CT chest: Lesion seen on chest x-ray from 1/12/2021 for symptoms of LRTI with rib pain. CT scan performed on 2/10/2021 
--Bilateral left upper lobe cavitary lung masses 
--Mediastinal and hilar lymphadenopathy: Annotated above. Largest is in right lower paratracheal region approximately 3 cm in size CT findings suspected to be from lung cancer with marissa metastasis 2. 
2.  COPD/emphysema, gold risk category D 
3. Dyspnea on exertion/shortness of breath: Due to above 4. Costochondritis: Etiology unclear, likely secondary to coughing in the setting of old rib fractures. Cannot rule out bone metastases given likely lung malignancy 5. Tobacco dependence to cigarettes: Current 2 pack/day smoker, prior 3 pack/day smoker at max for approximately 45 years 6. PAD: Patient underwent stenting of femoral artery in 2008. Patient on Plavix since then Plan: -Reviewed CT chest with patient in clinic. I went over possible etiologies both benign and malignant and indicated that he is high risk and unfortunately with lung masses lymph node enlargement, malignancy is very highly suspected and needs to be ruled out. We went over various methods of diagnosis from less invasive to more invasive - bronchoscopy with EBUS vs surgical approach, including risks and benefits of both (less invasive but possible nondiagnosis vs more invasive but improved diagnostic yield). I went over the bronchoscopy with EBUS procedure including technique as well as risks of the procedure - bronchospasm/resp failure, pneumothorax, bleeding, nondiagnostic sampling as well as risks of general anesthesia including arrhythmia and death. Pt was agreeable to attempt at diagnosis with bronchoscopy with EBUS prior to any surgical approach. Preop lab work ordered, CBC, coags, COVID-19 testing 
-Advised pt to discontinue Plavix starting tomorrow in anticipation for procedure on 2/23/2021. In the interim, advised patient to use aspirin 81 mg to maintain stent patency/avoid in-stent thrombosis during Plavix washout period 
-Prescribed extended course of prednisone, 40 mg daily x5 days and decrease by 10 mg every 5 days until patient gets down to 10 mg then decrease to 5 mg for 5 days. Prescribed course of omeprazole 40 mg every morning while on prednisone for stress ulcer prophylaxis 
-PET/CT scan and bone scan ordered for metastatic work-up 
-Full PFTs and 6-minute walk testing at next visit 
-Continue Trelegy Ellipta 1 puff once daily. Counseled patient to rinse mouth thoroughly after each use 
-Start albuterol HFA 1-2puffs q4-6h PRN. Counseled patient that this is their rescue inhaler. Also counseled patient regarding premedication 15-30m prior to exercise or exposure to very cold air 
-Counseled patient on proper inhaler technique 
-Advised patient remain active -Immunizations reviewed, patient declines influenza vaccination at this time 
-Counseled regarding lifestyle modifications including splinting to avoid excessive rib pain when coughing 
-Counseled patient regarding lifestyle precautions in COVID-19 pandemic including wearing mask in public and confined spaces, social/physical distancing, frequent hand hygiene, etc.  Advised pt to receive COVID-19 vaccination when possible 
-I spent 7 minutes with patient regarding cessation of smoking cigarettes. I reviewed health risks of tobacco use including increased risk of MI, stroke, cancer, etc.  We reviewed various approaches to cessation including pills, patches, inhaler, gum, weaning self, \"cold turkey\", etc.  Pt was agreeable to cessation -- I prescribed nicotine patch taper as well as PRN nicotine replacement with nicotine lozenges q1h PRN Follow-up and Dispositions · Return in about 3 weeks (around 3/9/2021). Orders Placed This Encounter  AMB POC PFT COMPLETE W/BRONCHODILATOR  AMB POC PFT COMPLETE W/O BRONCHODILATOR  
 GAS DILUT/WASHOUT LUNG VOL W/WO DISTRIB VENT&VOL  DIFFUSING CAPACITY  PRO PULMONARY STRESS TESTING  
 PET/CT TUMOR IMAGE SKULL THIGH W (INI)  NM BONE SCAN WH BODY  CBC WITH AUTOMATED DIFF  
 PROTHROMBIN TIME + INR  
 PTT  NOVEL CORONAVIRUS (COVID-19)  albuterol (PROVENTIL HFA, VENTOLIN HFA, PROAIR HFA) 90 mcg/actuation inhaler  nicotine (NICODERM CQ) 21 mg/24 hr  
 nicotine (NICODERM CQ) 14 mg/24 hr patch  
 nicotine (NICODERM CQ) 7 mg/24 hr  
 nicotine buccal (POLACRILEX) 2 mg lozg lozenge  predniSONE (DELTASONE) 10 mg tablet  omeprazole (PRILOSEC) 40 mg capsule Brian Braswell MD/MPH Pulmonary, Critical Care Medicine [facility]

## 2021-02-23 NOTE — ANESTHESIA POSTPROCEDURE EVALUATION
Procedure(s): ENDOSCOPIC BRONCHOSCOPY ULTRASOUND (EBUS)/RADIAL PROBE/PRECEPTA/C ARM FLEXIBLE BRONCHOSCOPY. general 
 
Anesthesia Post Evaluation Multimodal analgesia: multimodal analgesia used between 6 hours prior to anesthesia start to PACU discharge Patient location during evaluation: bedside Patient participation: complete - patient participated Level of consciousness: awake Pain management: adequate Airway patency: patent Anesthetic complications: no 
Cardiovascular status: stable Respiratory status: acceptable Hydration status: acceptable Post anesthesia nausea and vomiting:  controlled Final Post Anesthesia Temperature Assessment:  Normothermia (36.0-37.5 degrees C) INITIAL Post-op Vital signs:  
Vitals Value Taken Time /57 02/23/21 1315 Temp 36.7 °C (98.1 °F) 02/23/21 1303 Pulse 79 02/23/21 1318 Resp 22 02/23/21 1318 SpO2 92 % 02/23/21 1318 Vitals shown include unvalidated device data.

## 2021-02-23 NOTE — H&P
Update History & Physical 
 
The Patient's History and Physical of February 16, 2021, was reviewed with the patient and I examined the patient. There was no change. The procedure was confirmed by the patient and me. I went over the bronchoscopy with EBUS procedure including technique as well as risks of the procedure - bronchospasm/resp failure, pneumothorax, bleeding, nondiagnostic sampling as well as risks of general anesthesia including arrhythmia and death. Pt was agreeable to attempt at diagnosis with bronchoscopy with EBUS. Plan:  The risk, benefits, expected outcome, and alternative to the recommended procedure have been discussed with the patient. Patient understands and wants to proceed with the bronchoscopy with EBUS and biopsies. Bridget Carrillo MD/MPH Pulmonary, Critical Care Medicine [facility]

## 2021-02-23 NOTE — DISCHARGE INSTRUCTIONS
DISCHARGE SUMMARY from Nurse  PATIENT INSTRUCTIONS:    Resume Plavix tomorrow. After general anesthesia or intravenous sedation, for 24 hours or while taking prescription Narcotics:  · Limit your activities  · Do not drive and operate hazardous machinery  · Do not make important personal or business decisions  · Do  not drink alcoholic beverages  · If you have not urinated within 8 hours after discharge, please contact your surgeon on call. Report the following to your surgeon:  · Excessive pain, swelling, redness or odor of or around the surgical area  · Temperature over 100.5  · Nausea and vomiting lasting longer than 4 hours or if unable to take medications  · Any signs of decreased circulation or nerve impairment to extremity: change in color, persistent  numbness, tingling, coldness or increase pain  · Any questions    What to do at Home:  Recommended activity: Activity as tolerated and no driving for today. *  Please give a list of your current medications to your Primary Care Provider. *  Please update this list whenever your medications are discontinued, doses are      changed, or new medications (including over-the-counter products) are added. *  Please carry medication information at all times in case of emergency situations. These are general instructions for a healthy lifestyle:    No smoking/ No tobacco products/ Avoid exposure to second hand smoke  Surgeon General's Warning:  Quitting smoking now greatly reduces serious risk to your health.     Obesity, smoking, and sedentary lifestyle greatly increases your risk for illness    A healthy diet, regular physical exercise & weight monitoring are important for maintaining a healthy lifestyle    You may be retaining fluid if you have a history of heart failure or if you experience any of the following symptoms:  Weight gain of 3 pounds or more overnight or 5 pounds in a week, increased swelling in our hands or feet or shortness of breath while lying flat in bed. Please call your doctor as soon as you notice any of these symptoms; do not wait until your next office visit. The discharge information has been reviewed with the patient. The patient verbalized understanding. Discharge medications reviewed with the patient and appropriate educational materials and side effects teaching were provided. ___________________________________________________________________________________________________________________________________  Patient Education        Bronchoscopy: What to Expect at Home  Your Recovery     Bronchoscopy lets your doctor look at your airway through a tube called a bronchoscope. Afterward, you may feel tired for 1 or 2 days. Your mouth may feel very dry for several hours after the procedure. You may also have a sore throat and a hoarse voice for a few days. Sucking on throat lozenges or gargling with warm salt water may help soothe your sore throat. If a sample of tissue (biopsy) was taken, you may spit up a small amount of blood or have bloody saliva. This is normal.  Do not drive for at least 8 hours after the procedure. Do not smoke for at least 24 hours. This care sheet gives you a general idea about how long it will take for you to recover. But each person recovers at a different pace. Follow the steps below to get better as quickly as possible. How can you care for yourself at home? Activity    · Do not eat anything for 2 hours after the procedure.     · Rest when you feel tired. Getting enough sleep will help you recover.     · Avoid strenuous activities, such as bicycle riding, jogging, weight lifting, or aerobic exercise, until your doctor says it is okay.     · Ask your doctor when you can drive again. Diet    · You can eat your normal diet.  If your stomach is upset, try bland, low-fat foods like plain rice, broiled chicken, toast, and yogurt.     · If it is painful to swallow, start out with cold drinks, flavored ice pops, and ice cream. Next, try soft foods like pudding, yogurt, canned or cooked fruit, scrambled eggs, and mashed potatoes. Avoid eating hard or scratchy foods like chips or raw vegetables. Avoid orange or tomato juice and other acidic foods that can sting the throat.     · Drink plenty of fluids to avoid becoming dehydrated (unless your doctor tells you not to). Medicines    · Take pain medicines exactly as directed. ? If the doctor gave you a prescription medicine for pain, take it as prescribed. ? If you are not taking a prescription pain medicine, ask your doctor if you can take an over-the-counter medicine.     · If you think your pain medicine is making you sick to your stomach:  ? Take your medicine after meals (unless your doctor has told you not to). ? Ask your doctor for a different pain medicine.     · If your doctor prescribed antibiotics, take them as directed. Do not stop taking them just because you feel better. You need to take the full course of antibiotics. Follow-up care is a key part of your treatment and safety. Be sure to make and go to all appointments, and call your doctor if you are having problems. It's also a good idea to know your test results and keep a list of the medicines you take. When should you call for help? Call 911 anytime you think you may need emergency care. For example, call if:    · You passed out (lost consciousness).     · You have sudden chest pain and shortness of breath.     · You cough up large amounts of bright red blood.     · You have severe pain in your chest.     · You have severe trouble breathing. Call your doctor now or seek immediate medical care if:    · You cough up more than a few tablespoons of blood.     · You have pain that does not get better after you take pain medicine.     · You have a fever over 100°F.     · You still sound hoarse after a few days.     · You have bubbles under the skin around the collarbone.  These may crackle and pop when you press on them. Watch closely for changes in your health, and be sure to contact your doctor if you have any problems. Where can you learn more? Go to http://www.gray.com/  Enter U381 in the search box to learn more about \"Bronchoscopy: What to Expect at Home. \"  Current as of: February 24, 2020               Content Version: 12.6  © 1748-6491 [email or web address]. Care instructions adapted under license by [employer] (which disclaims liability or warranty for this information). If you have questions about a medical condition or this instruction, always ask your healthcare professional. [address] any warranty or liability for your use of this information.

## 2021-02-23 NOTE — ANESTHESIA PREPROCEDURE EVALUATION
Relevant Problems No relevant active problems Anesthetic History No history of anesthetic complications Review of Systems / Medical History Patient summary reviewed and pertinent labs reviewed Pulmonary COPD: mild Smoker Comments: Chronic cough with lung masses Neuro/Psych Within defined limits Cardiovascular Hypertension: well controlled Exercise tolerance: >4 METS 
  
GI/Hepatic/Renal 
  
GERD: well controlled Endo/Other Within defined limits Other Findings Physical Exam 
 
Airway Mallampati: II 
TM Distance: 4 - 6 cm Neck ROM: normal range of motion Mouth opening: Normal 
 
 Cardiovascular Regular rate and rhythm,  S1 and S2 normal,  no murmur, click, rub, or gallop Dental 
 
Dentition: Upper partial plate and Lower partial plate Pulmonary Breath sounds clear to auscultation Abdominal 
GI exam deferred Other Findings Anesthetic Plan ASA: 3 Anesthesia type: general 
 
 
 
 
Induction: Intravenous Anesthetic plan and risks discussed with: Patient

## 2021-02-23 NOTE — PROCEDURES
[address] Ramo Galloway 
[phone] [facility] Pulmonary, Critical Care, and Sleep Medicine Name: Murphy Rea MRN: [id number] : 1960 Hospital: [address] [place] [address] Date: 2021 Bronchoscopy with Endobronchial Ultrasound and Transbronchial Needle Aspiration Report Procedure: Diagnostic bronchoscopy with biopsies including EBUS/TBNA Indication: cavitary lung mass with mediastinal and hilar lymphadenopathy Consent/Treatment: Informed consent was obtained from the  patient after risks, benefits and alternatives were explained. Timeout verified the correct patient and correct procedure. Anesthesia:  
General anesthesia was performed by anesthesiology Procedure Details:  
-- The bronchoscope was introduced through an endotracheal tube. -- The trachea and fernando were completely inspected and were found to be normal. 
-- The right-sided endobronchial anatomy was completely inspected and showed some compression of the opening of the RML, otherwise the airways were normal overall. -- The left-sided endobronchial anatomy was completely inspected and tumor invasion of the DILLON along some endobronchial deviation near the left main fernando. --  The airways diffusely were inflamed and had thick mucoid secretions in multiple airways throughout which were tenacious and difficult to aspirate completely. -- The bronchoscope was wedged into the RML and brushings were performed in the medial and lateral segments and sent for genomic Percepta testing. 
-- The flexible bronchoscope was removed and the endobronchial ultrasound scope was inserted into the endotracheal tube -- The mediastinal and hilar lymph nodes were inspected showing lymphadenopathy at the following sites: station 4R, 10R, 10L -- Transbronchial needle aspiration using a 22 gauge needle was then performed using ultrasound guidance to the following lymph nodes/sites: station 4R (10 passes --- MARILU was positive, so no further sites were biopsied) -- Following transbronchial needle aspiration, the endobronchial ultrasound scope was removed and the flexible bronchoscope was then reinserted. -- The airways were reinspected and showed no active bleeding. Bloody secretions were aspirated as completely as possible 
-- The bronchoscope was inserted into the DILLON apicoposterior segment. Tthe radial EBUS probe was inserted and found abnormal tissue. The radial EBUS probe was then removed and biopsy forceps were inserted. Transbronchial biopsies were obtained. This was followed by cytology brushings from the same segment. -- Bronchial washings were collected for cytology -- BAL was performed at [facility] 
-- The airways were reinspected and showed no active bleeding. Bloody secretions were aspirated as completely as possible 
-- The flexible bronchoscope was then removed. -- CXR was ordered post procedure which did not show any evidence of pneumothorax --Images from the procedure are below and scanned into the patient's chart Specimens:  
Bronchial washings were sent for  cytology The bronchoscope was wedged in the DILLON and bronchoalveolar lavage was performed; material was sent for  cytology Cytology brushings from DILLON were sent Transbronchial biopsy from DILLON was performed using radial EBUS guidance and sent for  surgical pathology Transbronchial needle aspiration from station 4R was sent for  cytology Rapid On-Site Evaluation: A preliminary diagnosis of non-small cell lung cancer Complications: none Estimated Blood Loss: Minimal 
 
 
Airway inspection of fernando and right lung A) DILLON endobronchial lesion/mass; B and C) Station 4R; D) Pass at [facility] A) EBUS of station 4R; B) post linear EBUS; C and D) Radial EBUS of DILLON endobronchial lesion/mass Radial EBUS of DILLON endobronchial lesion Gene Puente MD/MPH Pulmonary, Critical Care Medicine [facility]

## 2021-02-25 NOTE — TELEPHONE ENCOUNTER
Called pt regarding biopsy results showing cancer. Will refer to Oncology, discussed with Dr. Heavenly Dean. Informed pt that he should followup with PET/CT and bone scan. We will also order MRI brain to complete staging.         Agnes Laureano MD/MPH     Pulmonary, Critical Care Medicine  [address] [place] [address] [facility]

## 2021-02-26 NOTE — TELEPHONE ENCOUNTER
Called pt regarding biopsy results and bone scan. Pt has thoracic mets, have advised MRI of spine, pt also complaining of neck pain, will order C spine as well as T spine. Advised to followup with Oncology and to contact our office if he does not hear back about appointments.       Alonzo Lofton MD/MPH     Pulmonary, Critical Care Medicine  [facility] See HPI

## 2021-03-07 NOTE — TELEPHONE ENCOUNTER
Late entry for call made on 3/6/21  Got a page from the answering service from wife. She reports that patient went to the ER on the evening of 3/5 and was kept in the ER and discharged around 4 AM.  Review of care everywhere does not show any progress notes, however only CTA of neck was done. She reports that the patient was not seen by spine surgery service. She stated that due to this, she felt frustrated, patient woke up with numbness and tingling in legs and weakness, so they took patient back to the ER. She reports that the patient needed help getting out of the car and expresses her frustration. She reports that the patient will be admitted. I advised her to follow-up with oncology, radiation oncology, as needed. I have advised her that follow-up from a pulmonary perspective is not needed given that patient needs treatment for his metastatic cancer urgently. This was a long conversation in which I attempted to allow Mrs. Singh to vent her frustrations and provide reassurance. I have advised her to call our office to reschedule when possible. All of her questions were answered.       Argelia Hurtado MD/MPH     Pulmonary, Critical Care Medicine  [facility]

## 2021-03-07 NOTE — TELEPHONE ENCOUNTER
3/5/21    Got a page from [facility] [address] regarding PET/CT scan. Spoke with Dr. 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 She is concerned these will require emergent workup including possible CTA. I then called Oncology - Dr. Nathan Blair and he stated he would call pt to go to [facility] for emergent spinal eval given hgih risk for spinal cord damage and quadraplegia. He would advise pt to take PET/CT report with him. MRI C-spine and T spine were ordered previously but were not scehduled until 3/20 - due to critical lesions, cannot wait this long.         Marco Miller MD/MPH     Pulmonary, Critical Care Medicine  [facility]

## 2021-06-01 NOTE — ED NOTES
Patient unable to provide urine specimen at this time. Spouse at bedside. VS stable. Call bell within reach.

## 2021-06-01 NOTE — ED TRIAGE NOTES
Patient here for chest pain that started last night. He states he developed swelling to lower legs last week. He states he had back surgery in February. Patient has dyspnea on exertion but states he has history of COPD and has lung cancer.

## 2021-06-01 NOTE — ED NOTES
I have introduced myself to the patient and wife. Patient is sitting in position of comfort in personal wheelchair, back brace not on. Patient c/o very localized right lower chest pain that radiates through to his back intermittently. Patient c/o back pain as well. Patient declines putting back brace on due to cardiac leads. MD notified that patient has requested home medications for chronic pain. I have returned to room and reviewed home pain medications and dosages with patient. Patient agrees with plan of care to give home medication dose for chronic pain here in ER. Dr. Dimlpe Irving notified.
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
## 2021-06-02 NOTE — ED NOTES
I have reviewed discharge instructions with the patient and spouse. The patient and spouse verbalized understanding. Patient assisted in placing back brace on and is being discharged in private wheelchair.

## 2021-06-11 PROBLEM — D58.9 HEMOLYTIC ANEMIA (HCC): Status: ACTIVE | Noted: 2021-01-01

## 2021-06-11 PROBLEM — J44.1 COPD EXACERBATION (HCC): Status: ACTIVE | Noted: 2021-01-01

## 2021-06-11 NOTE — ED NOTES
TRANSFER - OUT REPORT:    Verbal report given to Yuri Bradley RN(name) on Iztom Course Riffe  being transferred to [facility](unit) for routine progression of care       Report consisted of patients Situation, Background, Assessment and   Recommendations(SBAR). Information from the following report(s) SBAR was reviewed with the receiving nurse. Lines:   Peripheral IV 06/11/21 Left Hand (Active)       Peripheral IV 06/11/21 Right Antecubital (Active)   Site Assessment Clean, dry, & intact 06/11/21 1308   Phlebitis Assessment 0 06/11/21 1308   Infiltration Assessment 0 06/11/21 1308   Dressing Status Clean, dry, & intact 06/11/21 1308   Dressing Type Transparent 06/11/21 1308        Opportunity for questions and clarification was provided.       Patient transported with:   Monitor  O2 @ 3 liters

## 2021-06-11 NOTE — ED NOTES
Pt was given a urinal with instructions to provide urine specimen when able. Pt verbalized understanding.
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
## 2021-06-11 NOTE — ED NOTES
Verbal shift change report given to Elroy Leija RN (oncoming nurse) by Cesia Randle RN (offgoing nurse). Report included the following information SBAR   Awaiting [facility] to [address] [place] [address]. René Alonso

## 2021-06-11 NOTE — ED TRIAGE NOTES
Patient arrives via EMS with c/o shortness of breath following Keytruda tx at [facility].   EMS states giving albuterol tx and states IV in place on arrival

## 2021-06-12 NOTE — PROGRESS NOTES
OT orders received and patient's chart reviewed. OT attempted Evaluation x 3:    Attempt 1: Patient busy with nurse in room  Attempt 2 Nurse held due to patient receiving blood  Attempt 3: Patient refused due to pain    Patient agreeable to another attempt by OT on another day. OT to continue to follow as able.     Thank you,  [facility], OTR/L

## 2021-06-12 NOTE — PROGRESS NOTES
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 Contralateral marissa involvement by FNA of station 4R showing metastatic carcinoma. [id number], 02/23/2021. Status post FOB/EBUS, 02/23/2021. NGS ALK, NTRK, RET, ROS1, MET. NOT DETECTED. Liquid EGFR NOT DETECTED 5/2021. CT of chest, 02/10/2021:  Left upper lobe, 3 x 1.7 cm, lung nodule with some internal cavitation. A few spiculations extending to the lateral pleural and medially towards a second irregular nodule in the left upper lobe. This nodule measures 1.8 x 1.6 cm and abuts the mediastinal.  Right upper lobe, two areas of subtle reticulation without discrete nodule. Large right paratracheal lymph node, 2.5 x 2.8 cm. Right hilar lymph node, 1.5 x 2.0 cm. Multiple enlarged left hilar lymph nodes, up to 2.0 cm. A 2.0 cm subcarinal lymph node. Bone scan, 02/26/2021: Indeterminate uptake at the right iliac bone, rule out mets. Bony mets at the T-spine at T5, T6, and T7 with extensive sclerosis. Heterogeneous metastatic lesions at the right lateral aspect of T5 and T6. Pain due to above. T5 T8 posterior laminectomy with resection of epidural spinal tumor/cord compression. Post XRT to cervical thoracic spine. MRI Brain negative. Admission for cord compression 3/6/21-4/1/21. MRI C-spine 4/7/21: C4 pathologic fracture. Unchanged paravertebral or epidural component. Paraspinal mass extends into L C3-C4 and C4-C5. Encasing the L vertebral artery at C4. Skull remains intact. T-spine: T4 T8 decompression. Multiple fluid levels. Other medical conditions:  Emphysema. Peripheral vascular disease. Elevated cholesterol. Hypertension.   Status post angioplasty of the left lower extremity for peripheral vascular disease. Plan:   - pt was getting his first dose of keyturday on  and had episode of SOB, doubt reaction to Huel Son, more so due to COPD and anemia. Will let Dr. Jayson Valero decide if this was a true allergy to [facility]. - LDH high but could be from the cancer and also reaction yesterday. Hapto pending, TB normal ,doubt hemolysis. Will order JASON,   -  Hgb 6.9 before the keytruda, now after 2 PRBCs and Hgb 8.1 g/dl  - pulmonology following no plans for thoracocenthesis unless more symptomatic and pt will need to stay off plavix for 5 days at least .  - agree if tomorrow Hgb stable can go home and see Heidi Santos Tuesday or Wed.      Kendal Bower MD  [address] [place] [address] Ave [phone]      Subjective:   Pt feels better, c/o hip pain and will see Dr. Jonel Camilo neurosurgery Monday     Objective:     Visit Vitals  /75 (BP 1 Location: Left upper arm, BP Patient Position: At rest)   Pulse 88   Temp 97.6 °F (36.4 °C)   Resp 15   Ht 5' 9\" (1.753 m)   Wt 75.1 kg (165 lb 9.6 oz)   SpO2 99%   BMI 24.45 kg/m²             Temp (24hrs), Av.5 °F (36.4 °C), Min:97 °F (36.1 °C), Max:98 °F (36.7 °C)        Intake/Output Summary (Last 24 hours) at 2021 0926  Last data filed at 2021 2450  Gross per 24 hour   Intake 3968 ml   Output 3325 ml   Net 643 ml       Review of Systems:   Constitutional: negative for fevers, chills, sweats , +  fatigue  Eyes: negative for visual disturbance, redness and icterus  Ears, Nose, Mouth, Throat, and Face: negative for tinnitus, epistaxis, sore mouth and hoarseness  Respiratory: + SOB , + for productive cough, sputum,  Negative for hemoptysis, pleurisy/chest pain or wheezing  Cardiovascular: negative for chest pain, chest pressure/discomfort, palpitations, irregular heart beats, syncope, paroxysmal nocturnal dyspnea  Gastrointestinal: negative for reflux symptoms, nausea, vomiting, change in bowel habits, melena, diarrhea, constipation and abdominal pain  Genitourinary:negative for dysuria, nocturia, urinary incontinence, hesitancy and hematuria  Integument: negative for rash, skin lesion(s) and pruritus  Hematologic/Lymphatic: negative for easy bruising, bleeding and lymphadenopathy  Musculoskeletal:negative for myalgias, arthralgias and bone pain  Neurological: negative for headaches, dizziness, seizures, paresthesia and weakness    Physical Exam:  Constitutional: Alert, oriented, mild SOB, chronic-ill appearing   Eyes: PERRLA, anicteric, no redness. Pale  Ears, nose, mouth, throat, and face: no palpable Lymph nodes, no mucositis, no thrush. Respiratory: porfirio rhonchi, decreased BS on the right lower lung  Cardiovascular: S1S2, no pathologic murmur, no rub. Gastrointestinal: soft, benign, non tender, no HSM, normal bowel sounds, no mass. Integument: no rash, no petechiae, no ecchymosis. Musculoskeletal: no edema, no cyanosis, no clubbing. Neurological: intact, cranial nerves, no focal motor or sensory deficits.       Labs:  Recent Results (from the past 24 hour(s))   EKG, 12 LEAD, INITIAL    Collection Time: 06/11/21 12:58 PM   Result Value Ref Range    Ventricular Rate 91 BPM    Atrial Rate 91 BPM    P-R Interval 140 ms    QRS Duration 76 ms    Q-T Interval 352 ms    QTC Calculation (Bezet) 432 ms    Calculated P Axis 95 degrees    Calculated R Axis 31 degrees    Calculated T Axis 34 degrees    Diagnosis       Sinus rhythm with occasional premature ventricular complexes  Otherwise normal ECG  When compared with ECG of 01-JUN-2021 16:11,  premature supraventricular complexes are no longer present  Confirmed by Georgette Garcia M.D., [address] [place] [address] ([id number]) on 6/11/2021 9:04:51 PM     CBC WITH AUTOMATED DIFF    Collection Time: 06/11/21  1:15 PM   Result Value Ref Range    WBC 5.0 4.6 - 13.2 K/uL    RBC 2.78 (L) 4.35 - 5.65 M/uL    HGB 7.3 (L) 13.0 - 16.0 g/dL    HCT 23.7 (L) 36.0 - 48.0 %    MCV 85.3 74.0 - 97.0 FL    MCH 26.3 24.0 - 34.0 PG    MCHC 30.8 (L) 31.0 - 37.0 g/dL    RDW 16.6 (H) 11.6 - 14.5 %    PLATELET 188 (H) 404 - 420 K/uL    MPV 9.3 9.2 - 11.8 FL    NEUTROPHILS 94 (H) 40 - 73 %    LYMPHOCYTES 4 (L) 21 - 52 %    MONOCYTES 1 (L) 3 - 10 %    EOSINOPHILS 0 0 - 5 %    BASOPHILS 0 0 - 2 %    ABS. NEUTROPHILS 4.7 1.8 - 8.0 K/UL    ABS. LYMPHOCYTES 0.2 (L) 0.9 - 3.6 K/UL    ABS. MONOCYTES 0.0 (L) 0.05 - 1.2 K/UL    ABS. EOSINOPHILS 0.0 0.0 - 0.4 K/UL    ABS. BASOPHILS 0.0 0.0 - 0.1 K/UL    DF AUTOMATED     METABOLIC PANEL, COMPREHENSIVE    Collection Time: 06/11/21  1:15 PM   Result Value Ref Range    Sodium 130 (L) 136 - 145 mmol/L    Potassium 4.1 3.5 - 5.5 mmol/L    Chloride 96 (L) 100 - 111 mmol/L    CO2 29 21 - 32 mmol/L    Anion gap 5 3.0 - 18 mmol/L    Glucose 188 (H) 74 - 99 mg/dL    BUN 11 7.0 - 18 MG/DL    Creatinine 0.57 (L) 0.6 - 1.3 MG/DL    BUN/Creatinine ratio 19 12 - 20      GFR est AA >60 >60 ml/min/1.73m2    GFR est non-AA >60 >60 ml/min/1.73m2    Calcium 8.7 8.5 - 10.1 MG/DL    Bilirubin, total 0.7 0.2 - 1.0 MG/DL    ALT (SGPT) 15 (L) 16 - 61 U/L    AST (SGOT) 35 10 - 38 U/L    Alk.  phosphatase 99 45 - 117 U/L    Protein, total 7.3 6.4 - 8.2 g/dL    Albumin 2.8 (L) 3.4 - 5.0 g/dL    Globulin 4.5 (H) 2.0 - 4.0 g/dL    A-G Ratio 0.6 (L) 0.8 - 1.7     COVID-19 RAPID TEST    Collection Time: 06/11/21  1:15 PM   Result Value Ref Range    Specimen source Nasopharyngeal      COVID-19 rapid test Not detected NOTD     TROPONIN I    Collection Time: 06/11/21  1:15 PM   Result Value Ref Range    Troponin-I, QT <0.02 0.0 - 0.045 NG/ML   NT-PRO BNP    Collection Time: 06/11/21  1:15 PM   Result Value Ref Range    NT pro-BNP 1,059 (H) 0 - 900 PG/ML   LD    Collection Time: 06/11/21  1:15 PM   Result Value Ref Range     (H) 81 - 234 U/L   RETICULOCYTE COUNT    Collection Time: 06/11/21  1:15 PM   Result Value Ref Range    Reticulocyte count 3.1 (H) 0.5 - 2.5 %   POC LACTIC ACID    Collection Time: 06/11/21  1:25 PM   Result Value Ref Range    Lactic Acid (POC) 2.00 0.40 - 2.00 mmol/L   POC FECAL OCCULT BLOOD    Collection Time: 06/11/21  2:47 PM   Result Value Ref Range    Occult blood, stool (POC) Negative NEG     URINALYSIS W/ RFLX MICROSCOPIC    Collection Time: 06/11/21  4:23 PM   Result Value Ref Range    Color DARK YELLOW      Appearance CLOUDY      Specific gravity 1.025 1.005 - 1.030      pH (UA) 6.5 5.0 - 8.0      Protein TRACE (A) NEG mg/dL    Glucose Negative NEG mg/dL    Ketone Negative NEG mg/dL    Bilirubin Negative NEG      Blood Negative NEG      Urobilinogen 0.2 0.2 - 1.0 EU/dL    Nitrites Negative NEG      Leukocyte Esterase Negative NEG     URINE MICROSCOPIC ONLY    Collection Time: 06/11/21  4:23 PM   Result Value Ref Range    WBC 0 to 3 0 - 4 /hpf   VITAMIN B12 & FOLATE    Collection Time: 06/11/21  5:15 PM   Result Value Ref Range    Vitamin B12 >2,000 (H) 211 - 911 pg/mL    Folate >20.0 (H) 3.10 - 17.50 ng/mL   IRON PROFILE    Collection Time: 06/11/21  5:15 PM   Result Value Ref Range    Iron 41 (L) 50 - 175 ug/dL    TIBC 172 (L) 250 - 450 ug/dL    Iron % saturation 24 20 - 50 %   FERRITIN    Collection Time: 06/11/21  5:15 PM   Result Value Ref Range    Ferritin 1,032 (H) 8 - 388 NG/ML   PROCALCITONIN    Collection Time: 06/11/21  9:49 PM   Result Value Ref Range    Procalcitonin <0.05 ng/mL   HGB & HCT    Collection Time: 06/11/21  9:49 PM   Result Value Ref Range    HGB 6.4 (L) 13.0 - 16.0 g/dL    HCT 20.5 (L) 36.0 - 48.0 %   GLUCOSE, POC    Collection Time: 06/11/21 10:40 PM   Result Value Ref Range    Glucose (POC) 245 (H) 70 - 110 mg/dL   RBC, ALLOCATE    Collection Time: 06/12/21  1:45 AM   Result Value Ref Range    HISTORY CHECKED?  Historical check performed    CBC WITH AUTOMATED DIFF    Collection Time: 06/12/21  2:35 AM   Result Value Ref Range    WBC 7.3 4.6 - 13.2 K/uL    RBC 2.31 (L) 4.35 - 5.65 M/uL    HGB 6.2 (L) 13.0 - 16.0 g/dL    HCT 19.7 (L) 36.0 - 48.0 %    MCV 85.3 74.0 - 97.0 FL    MCH 26.8 24.0 - 34.0 PG    MCHC 31.5 31.0 - 37.0 g/dL    RDW 17.0 (H) 11.6 - 14.5 %    PLATELET 399 (H) 186 - 420 K/uL    MPV 9.6 9.2 - 11.8 FL    NEUTROPHILS 89 (H) 40 - 73 %    LYMPHOCYTES 4 (L) 21 - 52 %    MONOCYTES 7 3 - 10 %    EOSINOPHILS 0 0 - 5 %    BASOPHILS 0 0 - 2 %    ABS. NEUTROPHILS 6.5 1.8 - 8.0 K/UL    ABS. LYMPHOCYTES 0.3 (L) 0.9 - 3.6 K/UL    ABS. MONOCYTES 0.5 0.05 - 1.2 K/UL    ABS. EOSINOPHILS 0.0 0.0 - 0.4 K/UL    ABS.  BASOPHILS 0.0 0.0 - 0.1 K/UL    DF AUTOMATED     METABOLIC PANEL, BASIC    Collection Time: 06/12/21  2:35 AM   Result Value Ref Range    Sodium 134 (L) 136 - 145 mmol/L    Potassium 3.2 (L) 3.5 - 5.5 mmol/L    Chloride 101 100 - 111 mmol/L    CO2 31 21 - 32 mmol/L    Anion gap 2 (L) 3.0 - 18 mmol/L    Glucose 163 (H) 74 - 99 mg/dL    BUN 9 7.0 - 18 MG/DL    Creatinine 0.36 (L) 0.6 - 1.3 MG/DL    BUN/Creatinine ratio 25 (H) 12 - 20      GFR est AA >60 >60 ml/min/1.73m2    GFR est non-AA >60 >60 ml/min/1.73m2    Calcium 9.1 8.5 - 10.1 MG/DL   TYPE & SCREEN    Collection Time: 06/12/21  2:35 AM   Result Value Ref Range    Crossmatch Expiration 06/15/2021,2359     ABO/Rh(D) A POSITIVE     Antibody screen NEG     CALLED TO: ALENA SINGH [address] AT 1509 ON [id number] BY 1609     Unit number [id number]     Blood component type [facility]     Unit division 00     Status of unit ISSUED     Crossmatch result Compatible     Unit number [id number]     Blood component type [facility]     Unit division 00     Status of unit ALLOCATED     Crossmatch result Compatible    GLUCOSE, POC    Collection Time: 06/12/21  8:21 AM   Result Value Ref Range    Glucose (POC) 159 (H) 70 - 110 mg/dL

## 2021-06-12 NOTE — PROGRESS NOTES
HGB 6.4,no acute distress noted, notified Dr. Arthur Melendrez  About HGB and pt requesting cough medication, orders received.

## 2021-06-12 NOTE — PROGRESS NOTES
[facility] Hospitalist Group  Progress Note    Patient: Ashleigh Mccall Age: 56 y.o. : 1960 MR#: [id number] SSN: [id number]  Date: 2021     Subjective:     Patient reports breathing is much improved. He denies any bleeding episodes including no blood in stool or blood in urine. Patient reports chronic pain to across chest which is at baseline and no nausea vomiting. Patient suffers from chronic pain to right leg for which he is due to follow-up with neurosurgery on 2021    Assessment/Plan:     1. Shortness of breath, multifactorial - pulmonary consult appreciated, likely due to COPD /severe anemia /along with other chronic problems including stage IV metastatic lung, pleural effusion not felt to be contributory. CTA of chest negative for PE  2. Acute normocytic anemia ? Occult blood loss vs hemolysis -status post 2 units of PRBC's, stool Hemoccult negative in ED, stool Hemoccults ordered, continue iron supplement  3. Acute on chronic COPD exacerbation, mild -pulmonary consulted, continue IV steroids and SSI  4. Stage IV metastatic non-small cell lung cancer DILLON with bony and spine mets -followed by Dr. Yudi Caputo with outpatient  5. Chronic hypoxic respiratory failure -maintained on 3LNC; followed by SOLEDAD Diehl as OP  6. PAD s/p stent placement in  -Plavix on hold in setting of # 2  7. H/o epidural spinal cord tumor status post posterior laminectomy 2021 -patient has outpatient follow-up with ?   Dr. Kalie Pang on 2021 that he is hoping he can make it to    Patient defers update to family    Additional Notes:      Case discussed with:  [x]Patient  []Family  []Nursing  []Case Management  DVT Prophylaxis:  []Lovenox  []Hep SQ  [x]SCDs  []Coumadin   []On Heparin gtt    Objective:     VS:   Visit Vitals  /76   Pulse 82   Temp 97.2 °F (36.2 °C)   Resp 16   Ht 5' 9\" (1.753 m)   Wt 75.1 kg (165 lb 9.6 oz)   SpO2 98%   BMI 24.45 kg/m²      Tmax/24hrs: Temp (24hrs), Av.4 °F (36.3 °C), Min:97 °F (36.1 °C), Max:98.1 °F (36.7 °C)      Intake/Output Summary (Last 24 hours) at 2021 1328  Last data filed at 2021 1210  Gross per 24 hour   Intake 4706.3 ml   Output 3650 ml   Net 1056.3 ml     General:  Alert, NAD  HEENT: Oral mucosa moist; PERRLA  Cardiovascular:  RRR, Nl S1/S2  Pulmonary: Decreased breath sounds right lower lobe, normal respiratory effort  GI:  +BS in all four quadrants, soft, non-tender  Extremities:  No edema; 2+ dorsalis pedis pulses bilaterally  Neuro: alert and oriented x 4    Labs / micro / imaging :    Recent Results (from the past 24 hour(s))   POC FECAL OCCULT BLOOD    Collection Time: 21  2:47 PM   Result Value Ref Range    Occult blood, stool (POC) Negative NEG     URINALYSIS W/ RFLX MICROSCOPIC    Collection Time: 21  4:23 PM   Result Value Ref Range    Color DARK YELLOW      Appearance CLOUDY      Specific gravity 1.025 1.005 - 1.030      pH (UA) 6.5 5.0 - 8.0      Protein TRACE (A) NEG mg/dL    Glucose Negative NEG mg/dL    Ketone Negative NEG mg/dL    Bilirubin Negative NEG      Blood Negative NEG      Urobilinogen 0.2 0.2 - 1.0 EU/dL    Nitrites Negative NEG      Leukocyte Esterase Negative NEG     URINE MICROSCOPIC ONLY    Collection Time: 21  4:23 PM   Result Value Ref Range    WBC 0 to 3 0 - 4 /hpf   VITAMIN B12 & FOLATE    Collection Time: 21  5:15 PM   Result Value Ref Range    Vitamin B12 >2,000 (H) 211 - 911 pg/mL    Folate >20.0 (H) 3.10 - 17.50 ng/mL   IRON PROFILE    Collection Time: 21  5:15 PM   Result Value Ref Range    Iron 41 (L) 50 - 175 ug/dL    TIBC 172 (L) 250 - 450 ug/dL    Iron % saturation 24 20 - 50 %   FERRITIN    Collection Time: 21  5:15 PM   Result Value Ref Range    Ferritin 1,032 (H) 8 - 388 NG/ML   PROCALCITONIN    Collection Time: 21  9:49 PM   Result Value Ref Range    Procalcitonin <0.05 ng/mL   HGB & HCT    Collection Time: 21  9:49 PM   Result Value Ref Range    HGB 6.4 (L) 13.0 - 16.0 g/dL    HCT 20.5 (L) 36.0 - 48.0 %   GLUCOSE, POC    Collection Time: 06/11/21 10:40 PM   Result Value Ref Range    Glucose (POC) 245 (H) 70 - 110 mg/dL   RBC, ALLOCATE    Collection Time: 06/12/21  1:45 AM   Result Value Ref Range    HISTORY CHECKED? Historical check performed    CBC WITH AUTOMATED DIFF    Collection Time: 06/12/21  2:35 AM   Result Value Ref Range    WBC 7.3 4.6 - 13.2 K/uL    RBC 2.31 (L) 4.35 - 5.65 M/uL    HGB 6.2 (L) 13.0 - 16.0 g/dL    HCT 19.7 (L) 36.0 - 48.0 %    MCV 85.3 74.0 - 97.0 FL    MCH 26.8 24.0 - 34.0 PG    MCHC 31.5 31.0 - 37.0 g/dL    RDW 17.0 (H) 11.6 - 14.5 %    PLATELET 197 (H) 172 - 420 K/uL    MPV 9.6 9.2 - 11.8 FL    NEUTROPHILS 89 (H) 40 - 73 %    LYMPHOCYTES 4 (L) 21 - 52 %    MONOCYTES 7 3 - 10 %    EOSINOPHILS 0 0 - 5 %    BASOPHILS 0 0 - 2 %    ABS. NEUTROPHILS 6.5 1.8 - 8.0 K/UL    ABS. LYMPHOCYTES 0.3 (L) 0.9 - 3.6 K/UL    ABS. MONOCYTES 0.5 0.05 - 1.2 K/UL    ABS. EOSINOPHILS 0.0 0.0 - 0.4 K/UL    ABS.  BASOPHILS 0.0 0.0 - 0.1 K/UL    DF AUTOMATED     METABOLIC PANEL, BASIC    Collection Time: 06/12/21  2:35 AM   Result Value Ref Range    Sodium 134 (L) 136 - 145 mmol/L    Potassium 3.2 (L) 3.5 - 5.5 mmol/L    Chloride 101 100 - 111 mmol/L    CO2 31 21 - 32 mmol/L    Anion gap 2 (L) 3.0 - 18 mmol/L    Glucose 163 (H) 74 - 99 mg/dL    BUN 9 7.0 - 18 MG/DL    Creatinine 0.36 (L) 0.6 - 1.3 MG/DL    BUN/Creatinine ratio 25 (H) 12 - 20      GFR est AA >60 >60 ml/min/1.73m2    GFR est non-AA >60 >60 ml/min/1.73m2    Calcium 9.1 8.5 - 10.1 MG/DL   TYPE & SCREEN    Collection Time: 06/12/21  2:35 AM   Result Value Ref Range    Crossmatch Expiration 06/15/2021,2359     ABO/Rh(D) A POSITIVE     Antibody screen NEG     CALLED TO: Scott SINGH [address] AT 0422 ON [id number] BY 1608     Unit number [id number]     Blood component type  LR     Unit division 00     Status of unit ISSUED     Crossmatch result Compatible     Unit number [id number] Blood component type RC LR     Unit division 00     Status of unit ISSUED     Crossmatch result Compatible    DIRECT ISA    Collection Time: 06/12/21  2:35 AM   Result Value Ref Range    JASON Poly NEG    GLUCOSE, POC    Collection Time: 06/12/21  8:21 AM   Result Value Ref Range    Glucose (POC) 159 (H) 70 - 110 mg/dL   GLUCOSE, POC    Collection Time: 06/12/21 11:29 AM   Result Value Ref Range    Glucose (POC) 165 (H) 70 - 110 mg/dL       Results     Procedure Component Value Units Date/Time    CULTURE, BLOOD [[id number]] Collected: 06/11/21 1325    Order Status: Completed Specimen: Blood Updated: 06/12/21 1230     Special Requests: NO SPECIAL REQUESTS        Culture result: NO GROWTH AFTER 21 HOURS       COVID-19 RAPID TEST [[id number]] Collected: 06/11/21 1315    Order Status: Completed Specimen: Nasopharyngeal Updated: 06/11/21 1532     Specimen source Nasopharyngeal        COVID-19 rapid test Not detected        Comment: Rapid Abbott ID Now       Rapid NAAT:  The specimen is NEGATIVE for SARS-CoV-2, the novel coronavirus associated with COVID-19. Negative results should be treated as presumptive and, if inconsistent with clinical signs and symptoms or necessary for patient management, should be tested with an alternative molecular assay. Negative results do not preclude SARS-CoV-2 infection and should not be used as the sole basis for patient management decisions. This test has been authorized by the FDA under an Emergency Use Authorization (EUA) for use by authorized laboratories.    Fact sheet for Healthcare Providers: kstattoo.com  Fact sheet for Patients: kstattoo.com       Methodology: Isothermal Nucleic Acid Amplification         CULTURE, BLOOD [[id number]] Collected: 06/11/21 1310    Order Status: Completed Specimen: Blood Updated: 06/12/21 1230     Special Requests: NO SPECIAL REQUESTS        Culture result: NO GROWTH AFTER 21 HOURS CTA CHEST W OR W WO CONT    Result Date: 6/11/2021  1. No convincing CT evidence of pulmonary embolism. 2.  Lobulated solid mass in lateral left upper lobe appears unchanged. The second parenchymal/hilar mass appears stable as well. Significant encasement of left upper lobar artery with significant lumen narrowing again noted. 3. Stable right hilar and mediastinal adenopathy. 4. Increased large right pleural effusion and new small left pleural effusion. 5.  Multiple bilateral lung nodules appear unchanged and most concerning for lung metastasis. 6. Expansile rib mass at right sixth rib and multiple rib fractures as well as heterogeneous appearance in fused mid T-spine appear unchanged. Thank you for your referral.      XR CHEST PORT    Result Date: 6/11/2021  1. Overall stable exam compared to prior study 6/1/2021. 2. Unchanged patchy opacity at the left upper lobe, correlating with known cavitary neoplasm, as seen on prior CT. 3. Small right pleural effusion. Bibasilar atelectasis. Thank you for enabling us to participate in the care of this patient.        Signed By: Saw Patel NP     June 12, 2021

## 2021-06-12 NOTE — PROGRESS NOTES
I personally saw and evaluated this patient today. Patient is sitting in bed in no apparent distress, awake and alert, complains of dyspnea on minimal exertion. Continue duo nebs, steroids. Bronchial hygiene. Pulmonary following. Await input from oncology. PT and OT. Monitor labs. Resume Plavix as no thoracentesis planned by pulmonary. Anemia noted. No overt bleeding. Status post PRBC transfusion. Monitor. Discussed with patient. D/w APC.  Rest as outlined in APC note

## 2021-06-12 NOTE — PROGRESS NOTES
RESPIRATORY CARE ASSESSMENT FOR BRONCHIAL HYGIENE OR LUNG EXPANSION THERAPY  Patient  Tonie Khan     56 y.o.   male     6/12/2021  10:11 AM  Patient Active Problem List   Diagnosis Code    Pure hypercholesterolemia E78.00    Fracture of rib of left side S22.32XA    Hemolytic anemia ([facility] [address] D58.9    COPD exacerbation ([facility] [address] J44.1       ABG:  Date:6/12/2021  No results found for: PH, PHI, PCO2, PCO2I, PO2, PO2I, HCO3, HCO3I, FIO2, FIO2I    Chest X-ray:  Date:6/12/2021  Results from Hospital Encounter encounter on 06/11/21    XR CHEST PORT    Narrative  EXAM: CHEST PORTABLE    CLINICAL HISTORY/INDICATION: meets SIRS criteria, shortness of breath following  treatment at oncology center. History of lung cancer. COMPARISON: 6/1/2021. TECHNIQUE: Portable AP view was obtained. FINDINGS:    LINES/DEVICES: None. HEART/MEDIASTINUM: The cardiomediastinal silhouette is unremarkable in size. LUNGS: Redemonstration of patchy opacity at the left upper lobe, correlating  with known cavitary mass, as seen on prior CT 6/1/2021. Additional hazy  bibasilar opacities. Blunting of the right costophrenic sulcus. No pneumothorax. SOFT TISSUES: Unremarkable. BONES: Thoracic fusion hardware noted. Healing posterolateral right sixth rib  fracture. Multifocal osseous metastatic disease better seen on CT. Impression  1. Overall stable exam compared to prior study 6/1/2021.    2. Unchanged patchy opacity at the left upper lobe, correlating with known  cavitary neoplasm, as seen on prior CT. 3. Small right pleural effusion. Bibasilar atelectasis. Thank you for enabling us to participate in the care of this patient.       Lab Test:  Date:6/12/2021  WBC:   Lab Results   Component Value Date/Time    WBC 7.3 06/12/2021 02:35 AM   HGB:   Lab Results   Component Value Date/Time    HGB 6.2 (L) 06/12/2021 02:35 AM    PLTS:   Lab Results   Component Value Date/Time    PLATELET 910 (H) 96/04/1325 02:35 AM       SaO2%/flow: @QWJCGLI0(5)@    Vital Signs:     Patient Vitals for the past 8 hrs:   Temp Pulse Resp BP SpO2   06/12/21 0945 97.9 °F (36.6 °C) 87 16 134/77 98 %   06/12/21 0908 98.1 °F (36.7 °C) 87 16 125/66    06/12/21 0801 97.6 °F (36.4 °C) 88 15 134/75 99 %   06/12/21 0757     96 %   06/12/21 0730 (P) 97.3 °F (36.3 °C) (P) 91 (P) 14 (P) 132/77    06/12/21 0650 97.3 °F (36.3 °C) 97 12 127/70    06/12/21 0611 97.5 °F (36.4 °C) 92 12 126/70    06/12/21 0556 97.2 °F (36.2 °C) 94 20 124/73    06/12/21 0500 97.6 °F (36.4 °C) (!) 101 12 125/67 96 %         RA/O2 flow/device:3L NC      First Inital Assesment:     [x]Yes []No   Reevaluation/Reassessment:    []Yes [x]No     CHART REVIEW   Points 0 X 1 X 2 X 3 X 4 X Points   Pulmonary History Smoking History (1) none  Recent Smoking History <1 PPD  Recent Smoking History >1 PPD 2 Pulmonary Disease or Impairment  Severe Pulmonary Disease  2   Surgical History No Surgery 0 General Surgery  Lower Abdominal  Thoracic or Upper Abdominal  Thoracic & Pulmonary Disease  0   CXR Clear or not indicated  Chronic changes or CXR Pending  Infiltrate, atelectasis or pleural effusions  Infiltrates in more than 1lobe 3 Infiltrates +atelectasis  +/or pleural effusions  3     PATIENT ASSESSMENT    0 X 1 X 2 X 3 X 4 X Points   Respiratory Pattern Regular pattern RR 12-20 0 Increased RR 21-27   Mild Dyspnea at rest, irregular pattern RR 28-32  Moderate Dyspnea at rest, Use of accessory muscles, RR 33-36  Severe Dyspnea, Use of accessory muscles RR >36  0   Mental Status Alert Oriented cooperative 0 Confused, Follows commands  Lethargic, Does not follow commands  Obtunded  Unresponsive  0   Breath Sounds Clear  Decreased Unilaterally  Decreased Bilaterally 2 Mild Scattered wheezing or Crackles in bases  Severe Wheezing or rhonchi  2   Cough Strong dry NPC 0 Strong Productive  Weak NPC  Weak productive or weak with rhonchi  No cough or may require suctioning  0   Level of Activity Ambulatory Ambulatory with assistance 1 Temporarily Non-ambulatory  Non-Ambulatory, able to position self  Unable to position self, confined to bed  1   Total Points/Score:   8     Specific Intervention Chart(8)    Bronchial Hygiene/Secretion Clearance:    []EZPAP []Rotation bed with vibration    []CPT with percussor []CPT via vest   [x]Oscillastiang positive pressure expiratory device      Lung Expansion:    [x]Incentive Spirometer w/RT visits [x]Incentive Spirometer w/nursing    []EZPAP [] Metaneb     *Suctioning:    [x]Nasal Tracheal []Tracheal     *suctioning will be ordered and done PRN with an associated frequency such as QID/PRN based on score       Other:    Care Plan   Level # Score Modality Frequency Comment   Level 1 >17      Level 2 14-17      Level 3 10-13      Level 4 1-9 Low BID Aerobika     BRONCHIAL HYGIENE SCORING AND FREQUENCY GUIDELINES   Frequency Indications/Findings Level #   Q4 ATC Copious secretions, SOB, unable to sleep 1   QID & PRN at night Moderate amounts of secretions 2   TID or Q6 wa Small amounts of secretions and poor cough: recent history of secretions 3   BID or Q8 wa Unable to deep breathe and cough effectively 4        Comments:  Smk Hx < 3 Months, No Surg, Alert, Need Assist Ambulation, Home O2 (3L), No Home Cpap, No Resp Meds   Respiratory Therapist: Tejinder Morgan
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
## 2021-06-12 NOTE — PROGRESS NOTES
Problem: Falls - Risk of  Goal: *Absence of Falls  Description: Document Dolores Cr Fall Risk and appropriate interventions in the flowsheet.   Outcome: Progressing Towards Goal  Note: Fall Risk Interventions:            Medication Interventions: Bed/chair exit alarm    Elimination Interventions: Urinal in reach              Problem: Patient Education: Go to Patient Education Activity  Goal: Patient/Family Education  Outcome: Progressing Towards Goal     Problem: Anemia Care Plan (Adult and Pediatric)  Goal: *Labs within defined limits  Outcome: Progressing Towards Goal  Goal: *Tolerates increased activity  Outcome: Progressing Towards Goal     Problem: Patient Education: Go to Patient Education Activity  Goal: Patient/Family Education  Outcome: Progressing Towards Goal     Problem: Patient Education: Go to Patient Education Activity  Goal: Patient/Family Education  Outcome: Progressing Towards Goal     Problem: Heart Failure: Day 1  Goal: Off Pathway (Use only if patient is Off Pathway)  Outcome: Progressing Towards Goal  Goal: Activity/Safety  Outcome: Progressing Towards Goal  Goal: Consults, if ordered  Outcome: Progressing Towards Goal  Goal: Diagnostic Test/Procedures  Outcome: Progressing Towards Goal  Goal: Nutrition/Diet  Outcome: Progressing Towards Goal  Goal: Discharge Planning  Outcome: Progressing Towards Goal  Goal: Medications  Outcome: Progressing Towards Goal  Goal: Respiratory  Outcome: Progressing Towards Goal  Goal: Treatments/Interventions/Procedures  Outcome: Progressing Towards Goal  Goal: Psychosocial  Outcome: Progressing Towards Goal  Goal: *Oxygen saturation within defined limits  Outcome: Progressing Towards Goal  Goal: *Hemodynamically stable  Outcome: Progressing Towards Goal  Goal: *Optimal pain control at patient's stated goal  Outcome: Progressing Towards Goal  Goal: *Anxiety reduced or absent  Outcome: Progressing Towards Goal     Problem: Heart Failure: Day 2  Goal: Off Pathway (Use only if patient is Off Pathway)  Outcome: Progressing Towards Goal  Goal: Activity/Safety  Outcome: Progressing Towards Goal  Goal: Consults, if ordered  Outcome: Progressing Towards Goal  Goal: Diagnostic Test/Procedures  Outcome: Progressing Towards Goal  Goal: Nutrition/Diet  Outcome: Progressing Towards Goal  Goal: Discharge Planning  Outcome: Progressing Towards Goal  Goal: Medications  Outcome: Progressing Towards Goal  Goal: Respiratory  Outcome: Progressing Towards Goal  Goal: Treatments/Interventions/Procedures  Outcome: Progressing Towards Goal  Goal: Psychosocial  Outcome: Progressing Towards Goal  Goal: *Oxygen saturation within defined limits  Outcome: Progressing Towards Goal  Goal: *Hemodynamically stable  Outcome: Progressing Towards Goal  Goal: *Optimal pain control at patient's stated goal  Outcome: Progressing Towards Goal  Goal: *Anxiety reduced or absent  Outcome: Progressing Towards Goal  Goal: *Demonstrates progressive activity  Outcome: Progressing Towards Goal     Problem: Heart Failure: Day 3  Goal: Off Pathway (Use only if patient is Off Pathway)  Outcome: Progressing Towards Goal  Goal: Activity/Safety  Outcome: Progressing Towards Goal  Goal: Diagnostic Test/Procedures  Outcome: Progressing Towards Goal  Goal: Nutrition/Diet  Outcome: Progressing Towards Goal  Goal: Discharge Planning  Outcome: Progressing Towards Goal  Goal: Medications  Outcome: Progressing Towards Goal  Goal: Respiratory  Outcome: Progressing Towards Goal  Goal: Treatments/Interventions/Procedures  Outcome: Progressing Towards Goal  Goal: Psychosocial  Outcome: Progressing Towards Goal  Goal: *Oxygen saturation within defined limits  Outcome: Progressing Towards Goal  Goal: *Hemodynamically stable  Outcome: Progressing Towards Goal  Goal: *Optimal pain control at patient's stated goal  Outcome: Progressing Towards Goal  Goal: *Anxiety reduced or absent  Outcome: Progressing Towards Goal  Goal: *Demonstrates progressive activity  Outcome: Progressing Towards Goal     Problem: Heart Failure: Day 4  Goal: Off Pathway (Use only if patient is Off Pathway)  Outcome: Progressing Towards Goal  Goal: Activity/Safety  Outcome: Progressing Towards Goal  Goal: Diagnostic Test/Procedures  Outcome: Progressing Towards Goal  Goal: Nutrition/Diet  Outcome: Progressing Towards Goal  Goal: Discharge Planning  Outcome: Progressing Towards Goal  Goal: Medications  Outcome: Progressing Towards Goal  Goal: Respiratory  Outcome: Progressing Towards Goal  Goal: Treatments/Interventions/Procedures  Outcome: Progressing Towards Goal  Goal: Psychosocial  Outcome: Progressing Towards Goal  Goal: *Oxygen saturation within defined limits  Outcome: Progressing Towards Goal  Goal: *Hemodynamically stable  Outcome: Progressing Towards Goal  Goal: *Optimal pain control at patient's stated goal  Outcome: Progressing Towards Goal  Goal: *Anxiety reduced or absent  Outcome: Progressing Towards Goal  Goal: *Demonstrates progressive activity  Outcome: Progressing Towards Goal     Problem: Heart Failure: Day 5  Goal: Off Pathway (Use only if patient is Off Pathway)  Outcome: Progressing Towards Goal  Goal: Activity/Safety  Outcome: Progressing Towards Goal  Goal: Diagnostic Test/Procedures  Outcome: Progressing Towards Goal  Goal: Nutrition/Diet  Outcome: Progressing Towards Goal  Goal: Discharge Planning  Outcome: Progressing Towards Goal  Goal: Medications  Outcome: Progressing Towards Goal  Goal: Respiratory  Outcome: Progressing Towards Goal  Goal: Treatments/Interventions/Procedures  Outcome: Progressing Towards Goal  Goal: Psychosocial  Outcome: Progressing Towards Goal     Problem: Heart Failure: Discharge Outcomes  Goal: *Demonstrates ability to perform prescribed activity without shortness of breath or discomfort  Outcome: Progressing Towards Goal  Goal: *Left ventricular function assessment completed prior to or during stay, or planned for post-discharge  Outcome: Progressing Towards Goal  Goal: *ACEI prescribed if LVEF less than 40% and no contraindications or ARB prescribed  Outcome: Progressing Towards Goal  Goal: *Verbalizes understanding and describes prescribed diet  Outcome: Progressing Towards Goal  Goal: *Verbalizes understanding/describes prescribed medications  Outcome: Progressing Towards Goal  Goal: *Describes available resources and support systems  Description: (eg: Home Health, Palliative Care, [employer] Medical Directive)  Outcome: Progressing Towards Goal  Goal: *Describes smoking cessation resources  Outcome: Progressing Towards Goal  Goal: *Understands and describes signs and symptoms to report to providers(Stroke Metric)  Outcome: Progressing Towards Goal  Goal: *Describes/verbalizes understanding of follow-up/return appt  Description: (eg: to physicians, diabetes treatment coordinator, and other resources  Outcome: Progressing Towards Goal  Goal: *Describes importance of continuing daily weights and changes to report to physician  Outcome: Progressing Towards Goal
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
## 2021-06-13 NOTE — PROGRESS NOTES
D/C order noted for today. Orders reviewed. Met with pt and discussed pulmonary rehab and home health. Pt declined stating he is seeing a lung cancer doctor and if they feel that he needs home health, they will take care of it. He stated he used oxygen 3L at home and his wife will be bringing it to pick him up when discharged.         BERNADINE CorriganN RN  Care Management  Pager: [phone]
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
## 2021-06-13 NOTE — PROGRESS NOTES
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 Contralateral marissa involvement by FNA of station 4R showing metastatic carcinoma. [id number], 02/23/2021. Status post FOB/EBUS, 02/23/2021. NGS ALK, NTRK, RET, ROS1, MET. NOT DETECTED. Liquid EGFR NOT DETECTED 5/2021. CT of chest, 02/10/2021:  Left upper lobe, 3 x 1.7 cm, lung nodule with some internal cavitation. A few spiculations extending to the lateral pleural and medially towards a second irregular nodule in the left upper lobe. This nodule measures 1.8 x 1.6 cm and abuts the mediastinal.  Right upper lobe, two areas of subtle reticulation without discrete nodule. Large right paratracheal lymph node, 2.5 x 2.8 cm. Right hilar lymph node, 1.5 x 2.0 cm. Multiple enlarged left hilar lymph nodes, up to 2.0 cm. A 2.0 cm subcarinal lymph node. Bone scan, 02/26/2021: Indeterminate uptake at the right iliac bone, rule out mets. Bony mets at the T-spine at T5, T6, and T7 with extensive sclerosis. Heterogeneous metastatic lesions at the right lateral aspect of T5 and T6. Pain due to above. T5 T8 posterior laminectomy with resection of epidural spinal tumor/cord compression. Post XRT to cervical thoracic spine. MRI Brain negative. Admission for cord compression 3/6/21-4/1/21. MRI C-spine 4/7/21: C4 pathologic fracture. Unchanged paravertebral or epidural component. Paraspinal mass extends into L C3-C4 and C4-C5. Encasing the L vertebral artery at C4. Skull remains intact. T-spine: T4 T8 decompression. Multiple fluid levels. Other medical conditions:  Emphysema. Peripheral vascular disease. Elevated cholesterol. Hypertension.   Status post angioplasty of the left lower extremity for peripheral vascular disease. Plan:   - please remove from problme list the \"hemolytic anemia\", pt does not have hemolytic anemia.  - Haptoglobin normal, TB normal, JASON neg, LDH high due to cancer and recent inflammatory response.   - Can go home from my standpoint . See Dr. Leena Perez this week to discuss systemic therapy.      Merlyn Dubois MD  [facility] [phone]      Subjective:         Objective:     Visit Vitals  /77   Pulse 93   Temp 97 °F (36.1 °C)   Resp 18   Ht 5' 9\" (1.753 m)   Wt 75.1 kg (165 lb 9.6 oz)   SpO2 95%   BMI 24.45 kg/m²             Temp (24hrs), Av.4 °F (36.3 °C), Min:97 °F (36.1 °C), Max:98.1 °F (36.7 °C)        Intake/Output Summary (Last 24 hours) at 2021 0720  Last data filed at 2021 0338  Gross per 24 hour   Intake 1778.3 ml   Output 925 ml   Net 853.3 ml           Labs:  Recent Results (from the past 24 hour(s))   GLUCOSE, POC    Collection Time: 21  8:21 AM   Result Value Ref Range    Glucose (POC) 159 (H) 70 - 110 mg/dL   GLUCOSE, POC    Collection Time: 21 11:29 AM   Result Value Ref Range    Glucose (POC) 165 (H) 70 - 110 mg/dL   HGB & HCT    Collection Time: 21  2:55 PM   Result Value Ref Range    HGB 8.1 (L) 13.0 - 16.0 g/dL    HCT 25.0 (L) 36.0 - 48.0 %   GLUCOSE, POC    Collection Time: 21  4:01 PM   Result Value Ref Range    Glucose (POC) 175 (H) 70 - 110 mg/dL   GLUCOSE, POC    Collection Time: 21  9:02 PM   Result Value Ref Range    Glucose (POC) 155 (H) 70 - 110 mg/dL   CBC WITH AUTOMATED DIFF    Collection Time: 21  2:09 AM   Result Value Ref Range    WBC 9.2 4.6 - 13.2 K/uL    RBC 2.81 (L) 4.35 - 5.65 M/uL    HGB 7.7 (L) 13.0 - 16.0 g/dL    HCT 23.6 (L) 36.0 - 48.0 %    MCV 84.0 74.0 - 97.0 FL    MCH 27.4 24.0 - 34.0 PG    MCHC 32.6 31.0 - 37.0 g/dL    RDW 16.1 (H) 11.6 - 14.5 %    PLATELET 633 (H) 749 - 420 K/uL    MPV 9.5 9.2 - 11.8 FL    NEUTROPHILS 89 (H) 40 - 73 %    LYMPHOCYTES 3 (L) 21 - 52 %    MONOCYTES 7 3 - 10 %    EOSINOPHILS 0 0 - 5 %    BASOPHILS 0 0 - 2 %    ABS. NEUTROPHILS 8.1 (H) 1.8 - 8.0 K/UL    ABS. LYMPHOCYTES 0.3 (L) 0.9 - 3.6 K/UL    ABS. MONOCYTES 0.6 0.05 - 1.2 K/UL    ABS. EOSINOPHILS 0.0 0.0 - 0.4 K/UL    ABS. BASOPHILS 0.0 0.0 - 0.1 K/UL    DF AUTOMATED     MAGNESIUM    Collection Time: 06/13/21  2:09 AM   Result Value Ref Range    Magnesium 2.1 1.6 - 2.6 mg/dL   METABOLIC PANEL, COMPREHENSIVE    Collection Time: 06/13/21  2:09 AM   Result Value Ref Range    Sodium 137 136 - 145 mmol/L    Potassium 3.5 3.5 - 5.5 mmol/L    Chloride 102 100 - 111 mmol/L    CO2 32 21 - 32 mmol/L    Anion gap 3 3.0 - 18 mmol/L    Glucose 141 (H) 74 - 99 mg/dL    BUN 12 7.0 - 18 MG/DL    Creatinine 0.28 (L) 0.6 - 1.3 MG/DL    BUN/Creatinine ratio 43 (H) 12 - 20      GFR est AA >60 >60 ml/min/1.73m2    GFR est non-AA >60 >60 ml/min/1.73m2    Calcium 8.6 8.5 - 10.1 MG/DL    Bilirubin, total 0.8 0.2 - 1.0 MG/DL    ALT (SGPT) 13 (L) 16 - 61 U/L    AST (SGOT) 21 10 - 38 U/L    Alk.  phosphatase 76 45 - 117 U/L    Protein, total 6.4 6.4 - 8.2 g/dL    Albumin 2.7 (L) 3.4 - 5.0 g/dL    Globulin 3.7 2.0 - 4.0 g/dL    A-G Ratio 0.7 (L) 0.8 - 1.7     PROTHROMBIN TIME + INR    Collection Time: 06/13/21  2:09 AM   Result Value Ref Range    Prothrombin time 15.4 (H) 11.5 - 15.2 sec    INR 1.2 0.8 - 1.2
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
## 2021-06-13 NOTE — ROUTINE PROCESS
Bedside and Verbal shift change report given to Bakari Kaba RN (oncoming nurse) by Corinna Douglas RN (offgoing nurse). Report included the following information SBAR, Kardex, Intake/Output, MAR and Recent Results.

## 2021-06-13 NOTE — PROGRESS NOTES
Phone: [phone]     Hematology / Oncology Progress Note  [facility]      Patient: Duarte Nicholson   MRN: [id number]         CSN: [id number]    YOB: 1960      Admit Date: 6/11/2021    Assessment:   1- Stage III/IV poorly differentiated adenocarcinoma of the left upper lobe by lung mass biopsy, [id number], 02/23/2021. Contralateral marissa involvement by FNA of station 4R showing metastatic carcinoma. [id number], 02/23/2021. Status post FOB/EBUS, 02/23/2021. NGS ALK, NTRK, RET, ROS1, MET. NOT DETECTED. Liquid EGFR NOT DETECTED 5/2021. CT of chest, 02/10/2021:  Left upper lobe, 3 x 1.7 cm, lung nodule with some internal cavitation. A few spiculations extending to the lateral pleural and medially towards a second irregular nodule in the left upper lobe. This nodule measures 1.8 x 1.6 cm and abuts the mediastinal.  Right upper lobe, two areas of subtle reticulation without discrete nodule. Large right paratracheal lymph node, 2.5 x 2.8 cm. Right hilar lymph node, 1.5 x 2.0 cm. Multiple enlarged left hilar lymph nodes, up to 2.0 cm. A 2.0 cm subcarinal lymph node. Bone scan, 02/26/2021: Indeterminate uptake at the right iliac bone, rule out mets. Bony mets at the T-spine at T5, T6, and T7 with extensive sclerosis. Heterogeneous metastatic lesions at the right lateral aspect of T5 and T6. Pain due to above. T5 T8 posterior laminectomy with resection of epidural spinal tumor/cord compression. Post XRT to cervical thoracic spine. MRI Brain negative. Admission for cord compression 3/6/21-4/1/21. MRI C-spine 4/7/21: C4 pathologic fracture. Unchanged paravertebral or epidural component. Paraspinal mass extends into L C3-C4 and C4-C5. Encasing the L vertebral artery at C4. Skull remains intact. T-spine: T4 T8 decompression. Multiple fluid levels. Other medical conditions:  Emphysema. Peripheral vascular disease. Elevated cholesterol. Hypertension.   Status post angioplasty of the left lower extremity for peripheral vascular disease. Plan:   - please remove from problme list the \"hemolytic anemia\", pt does not have hemolytic anemia.  - Haptoglobin normal, TB normal, JASON neg, LDH high due to cancer and recent inflammatory response.   - Can go home from my standpoint . See Dr. Paul Reid this week to discuss systemic therapy. Nolberto Metz MD  [facility] [phone]      Subjective:     Pt was leaving when  I saw him ,doing better. Objective:     Visit Vitals  BP (!) 156/93   Pulse 90   Temp 97.8 °F (36.6 °C)   Resp 20   Ht 5' 9\" (1.753 m)   Wt 165 lb 9.6 oz (75.1 kg)   SpO2 94%   BMI 24.45 kg/m²             Temp (24hrs), Av.5 °F (36.4 °C), Min:97 °F (36.1 °C), Max:97.8 °F (36.6 °C)        Intake/Output Summary (Last 24 hours) at 2021 1600  Last data filed at 2021 0930  Gross per 24 hour   Intake 480 ml   Output 900 ml   Net -420 ml           Labs:  Recent Results (from the past 24 hour(s))   GLUCOSE, POC    Collection Time: 21  4:01 PM   Result Value Ref Range    Glucose (POC) 175 (H) 70 - 110 mg/dL   GLUCOSE, POC    Collection Time: 21  9:02 PM   Result Value Ref Range    Glucose (POC) 155 (H) 70 - 110 mg/dL   CBC WITH AUTOMATED DIFF    Collection Time: 21  2:09 AM   Result Value Ref Range    WBC 9.2 4.6 - 13.2 K/uL    RBC 2.81 (L) 4.35 - 5.65 M/uL    HGB 7.7 (L) 13.0 - 16.0 g/dL    HCT 23.6 (L) 36.0 - 48.0 %    MCV 84.0 74.0 - 97.0 FL    MCH 27.4 24.0 - 34.0 PG    MCHC 32.6 31.0 - 37.0 g/dL    RDW 16.1 (H) 11.6 - 14.5 %    PLATELET 155 (H) 976 - 420 K/uL    MPV 9.5 9.2 - 11.8 FL    NEUTROPHILS 89 (H) 40 - 73 %    LYMPHOCYTES 3 (L) 21 - 52 %    MONOCYTES 7 3 - 10 %    EOSINOPHILS 0 0 - 5 %    BASOPHILS 0 0 - 2 %    ABS. NEUTROPHILS 8.1 (H) 1.8 - 8.0 K/UL    ABS. LYMPHOCYTES 0.3 (L) 0.9 - 3.6 K/UL    ABS. MONOCYTES 0.6 0.05 - 1.2 K/UL    ABS. EOSINOPHILS 0.0 0.0 - 0.4 K/UL    ABS.  BASOPHILS 0.0 0.0 - 0.1 K/UL    DF AUTOMATED     MAGNESIUM    Collection Time: 06/13/21  2:09 AM   Result Value Ref Range    Magnesium 2.1 1.6 - 2.6 mg/dL   METABOLIC PANEL, COMPREHENSIVE    Collection Time: 06/13/21  2:09 AM   Result Value Ref Range    Sodium 137 136 - 145 mmol/L    Potassium 3.5 3.5 - 5.5 mmol/L    Chloride 102 100 - 111 mmol/L    CO2 32 21 - 32 mmol/L    Anion gap 3 3.0 - 18 mmol/L    Glucose 141 (H) 74 - 99 mg/dL    BUN 12 7.0 - 18 MG/DL    Creatinine 0.28 (L) 0.6 - 1.3 MG/DL    BUN/Creatinine ratio 43 (H) 12 - 20      GFR est AA >60 >60 ml/min/1.73m2    GFR est non-AA >60 >60 ml/min/1.73m2    Calcium 8.6 8.5 - 10.1 MG/DL    Bilirubin, total 0.8 0.2 - 1.0 MG/DL    ALT (SGPT) 13 (L) 16 - 61 U/L    AST (SGOT) 21 10 - 38 U/L    Alk.  phosphatase 76 45 - 117 U/L    Protein, total 6.4 6.4 - 8.2 g/dL    Albumin 2.7 (L) 3.4 - 5.0 g/dL    Globulin 3.7 2.0 - 4.0 g/dL    A-G Ratio 0.7 (L) 0.8 - 1.7     PROTHROMBIN TIME + INR    Collection Time: 06/13/21  2:09 AM   Result Value Ref Range    Prothrombin time 15.4 (H) 11.5 - 15.2 sec    INR 1.2 0.8 - 1.2     GLUCOSE, POC    Collection Time: 06/13/21  8:19 AM   Result Value Ref Range    Glucose (POC) 162 (H) 70 - 110 mg/dL   HGB & HCT    Collection Time: 06/13/21  9:44 AM   Result Value Ref Range    HGB 8.1 (L) 13.0 - 16.0 g/dL    HCT 25.4 (L) 36.0 - 48.0 %   GLUCOSE, POC    Collection Time: 06/13/21 11:39 AM   Result Value Ref Range    Glucose (POC) 154 (H) 70 - 110 mg/dL

## 2021-06-13 NOTE — PROGRESS NOTES
I have reviewed discharge instructions with the patient. The patient verbalized understanding. Discharge medications reviewed with patient and appropriate educational materials and side effects teaching were provided. piv outx2    Pt aware of need to schedule follow up appointments.

## 2021-06-13 NOTE — DISCHARGE SUMMARY
[facility]  Discharge Summary       Patient: Guillermina Medina Age: 56 y.o. : 1960 MR#: [id number] [id number]: [id number]  PCP on record: Rickey Meier NP  Admit date: 2021  Discharge date: 2021    Disposition:    []Home   []Home with Home Health   []SNF/NH   []Rehab   [x]Home with family   []Alternate Facility:____________________    Admission Diagnoses:  Hemolytic anemia ([facility] [address] [D58.9]  COPD exacerbation ([facility] [address] [J44.1]    Discharge Diagnoses:                             Shortness of breath, multifactorial   Acute normocytic anemia, likely d/t hemolysis   Acute on chronic COPD exacerbation, mild   Stage IV metastatic non-small cell lung cancer DILLON with bony and spine mets   Chronic hypoxic respiratory failure -maintained on 3LNC; followed by SOLEDAD Portillo as OP  Right pleural effusion-small-to-moderate    PAD s/p stent placement   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 Qty: 30 Tablet, Refills: 0      famotidine (PEPCID) 20 mg tablet Take 1 Tablet by mouth two (2) times a day. Qty: 30 Tablet, Refills: 0      docusate sodium (COLACE) 100 mg capsule Take 1 Capsule by mouth two (2) times a day for 30 days. Qty: 60 Capsule, Refills: 0      azithromycin (ZITHROMAX) 500 mg tab Take 1 Tablet by mouth daily for 3 days. Qty: 3 Tablet, Refills: 0         CONTINUE these medications which have NOT CHANGED    Details   labetaloL (NORMODYNE) 100 mg tablet Take 100 mg by mouth two (2) times a day. oxyCODONE IR (ROXICODONE) 10 mg tab immediate release tablet Take 10 mg by mouth every four (4) hours as needed for Pain (every 4 to 6 hours as needed).  Every 4 to 6 hours as needed      morphine (LORENZO) 10 mg SR capsule Take 3 mg by mouth two (2) times a day. guaiFENesin (ROBITUSSIN) 100 mg/5 mL liquid Take 200 mg by mouth three (3) times daily as needed for Cough. cyclobenzaprine (FLEXERIL) 10 mg tablet Take 10 mg by mouth nightly. clopidogreL (PLAVIX) 75 mg tab TAKE 1 TABLET DAILY  Qty: 90 Tab, Refills: 4      multivitamin (ONE A DAY) tablet Take 1 Tab by mouth daily. STOP taking these medications       pembrolizumab (KEYTRUDA IV) Comments:   Reason for Stopping:         clindamycin (CLEOCIN) 300 mg capsule Comments:   Reason for Stopping:             Consults:    - Pulmonary   - Oncology     Procedures:  -   None    Significant Diagnostic Studies:   -  CTA CHEST W OR W WO CONT    Result Date: 6/11/2021  1. No convincing CT evidence of pulmonary embolism. 2.  Lobulated solid mass in lateral left upper lobe appears unchanged. The second parenchymal/hilar mass appears stable as well. Significant encasement of left upper lobar artery with significant lumen narrowing again noted. 3. Stable right hilar and mediastinal adenopathy. 4. Increased large right pleural effusion and new small left pleural effusion. 5.  Multiple bilateral lung nodules appear unchanged and most concerning for lung metastasis. 6. Expansile rib mass at right sixth rib and multiple rib fractures as well as heterogeneous appearance in fused mid T-spine appear unchanged. Thank you for your referral.      XR CHEST PORT    Result Date: 6/11/2021  1. Overall stable exam compared to prior study 6/1/2021. 2. Unchanged patchy opacity at the left upper lobe, correlating with known cavitary neoplasm, as seen on prior CT. 3. Small right pleural effusion. Bibasilar atelectasis. Thank you for enabling us to participate in the care of this patient. Hospital Course by Problem   1.  Shortness of breath, multifactorial - in setting of mild COPD exacerbation /severe anemia /along with other chronic problems including stage IV metastatic lung, pleural effusion not felt to be contributory. CTA of chest negative for PE. At baseline breathing status at time of discharge. 2. Acute normocytic anemia, likely d/t hemolysis -at time of admission earlier in hospitalization with hematocrit down to 6.2 for which patient received 2 units of PRBC's with improvement to  8.1 prior to discharge. Stool Hemoccult negative in ED, started on and continued with iron supplement at time of discharge. 3. Acute on chronic COPD exacerbation, mild -treated with short course of IV steroids during admission discussed with pulmonary prior to discharge no further steroids needed. 4. Stage IV metastatic non-small cell lung cancer DILLON with bony and spine mets -oncology was consulted while inpatient, close follow-up with oncology requested for further discussion of cancer management. 5. Chronic hypoxic respiratory failure -maintained on 3LNC; followed by SOLEDAD Diehl as OP  6. Right pleural effusion-small-to-moderate -pulmonary followed during admission and too small to drain per pulmonary evaluation  7. PAD s/p stent placement in  -Plavix initially on hold in setting of  anemia and questionable drainage of pleural effusion however this was resumed prior to discharge. 8. 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     Continues to have chronic pain to right hip    Objective:   VS:   Visit Vitals  BP (!) 143/80 (BP 1 Location: Right upper arm, BP Patient Position: At rest)   Pulse 94   Temp 97.7 °F (36.5 °C)   Resp 16   Ht 5' 9\" (1.753 m)   Wt 75.1 kg (165 lb 9.6 oz)   SpO2 97%   BMI 24.45 kg/m²      Tmax/24hrs: Temp (24hrs), Av.4 °F (36.3 °C), Min:97 °F (36.1 °C), Max:98 °F (36.7 °C)     Input/Output:     Intake/Output Summary (Last 24 hours) at 2021 1122  Last data filed at 6/13/2021 0854  Gross per 24 hour   Intake 978.3 ml   Output 1225 ml   Net -246.7 ml       General:  Alert, NAD  Cardiovascular:  RRR  Pulmonary: Dim to right base but otherwise LSC throughout; respiratory effort WNL  GI:  +BS in all four quadrants, soft, non-tender  Extremities:  No edema; 2+ dorsalis pedis pulses bilaterally  Neurology: Alert and oriented x4    Labs:    Recent Results (from the past 24 hour(s))   GLUCOSE, POC    Collection Time: 06/12/21 11:29 AM   Result Value Ref Range    Glucose (POC) 165 (H) 70 - 110 mg/dL   HGB & HCT    Collection Time: 06/12/21  2:55 PM   Result Value Ref Range    HGB 8.1 (L) 13.0 - 16.0 g/dL    HCT 25.0 (L) 36.0 - 48.0 %   GLUCOSE, POC    Collection Time: 06/12/21  4:01 PM   Result Value Ref Range    Glucose (POC) 175 (H) 70 - 110 mg/dL   GLUCOSE, POC    Collection Time: 06/12/21  9:02 PM   Result Value Ref Range    Glucose (POC) 155 (H) 70 - 110 mg/dL   CBC WITH AUTOMATED DIFF    Collection Time: 06/13/21  2:09 AM   Result Value Ref Range    WBC 9.2 4.6 - 13.2 K/uL    RBC 2.81 (L) 4.35 - 5.65 M/uL    HGB 7.7 (L) 13.0 - 16.0 g/dL    HCT 23.6 (L) 36.0 - 48.0 %    MCV 84.0 74.0 - 97.0 FL    MCH 27.4 24.0 - 34.0 PG    MCHC 32.6 31.0 - 37.0 g/dL    RDW 16.1 (H) 11.6 - 14.5 %    PLATELET 491 (H) 563 - 420 K/uL    MPV 9.5 9.2 - 11.8 FL    NEUTROPHILS 89 (H) 40 - 73 %    LYMPHOCYTES 3 (L) 21 - 52 %    MONOCYTES 7 3 - 10 %    EOSINOPHILS 0 0 - 5 %    BASOPHILS 0 0 - 2 %    ABS. NEUTROPHILS 8.1 (H) 1.8 - 8.0 K/UL    ABS. LYMPHOCYTES 0.3 (L) 0.9 - 3.6 K/UL    ABS. MONOCYTES 0.6 0.05 - 1.2 K/UL    ABS. EOSINOPHILS 0.0 0.0 - 0.4 K/UL    ABS.  BASOPHILS 0.0 0.0 - 0.1 K/UL    DF AUTOMATED     MAGNESIUM    Collection Time: 06/13/21  2:09 AM   Result Value Ref Range    Magnesium 2.1 1.6 - 2.6 mg/dL   METABOLIC PANEL, COMPREHENSIVE    Collection Time: 06/13/21  2:09 AM   Result Value Ref Range    Sodium 137 136 - 145 mmol/L    Potassium 3.5 3.5 - 5.5 mmol/L    Chloride 102 100 - 111 mmol/L    CO2 32 21 - 32 mmol/L    Anion gap 3 3.0 - 18 mmol/L    Glucose 141 (H) 74 - 99 mg/dL    BUN 12 7.0 - 18 MG/DL    Creatinine 0.28 (L) 0.6 - 1.3 MG/DL    BUN/Creatinine ratio 43 (H) 12 - 20      GFR est AA >60 >60 ml/min/1.73m2    GFR est non-AA >60 >60 ml/min/1.73m2    Calcium 8.6 8.5 - 10.1 MG/DL    Bilirubin, total 0.8 0.2 - 1.0 MG/DL    ALT (SGPT) 13 (L) 16 - 61 U/L    AST (SGOT) 21 10 - 38 U/L    Alk.  phosphatase 76 45 - 117 U/L    Protein, total 6.4 6.4 - 8.2 g/dL    Albumin 2.7 (L) 3.4 - 5.0 g/dL    Globulin 3.7 2.0 - 4.0 g/dL    A-G Ratio 0.7 (L) 0.8 - 1.7     PROTHROMBIN TIME + INR    Collection Time: 06/13/21  2:09 AM   Result Value Ref Range    Prothrombin time 15.4 (H) 11.5 - 15.2 sec    INR 1.2 0.8 - 1.2     GLUCOSE, POC    Collection Time: 06/13/21  8:19 AM   Result Value Ref Range    Glucose (POC) 162 (H) 70 - 110 mg/dL   HGB & HCT    Collection Time: 06/13/21  9:44 AM   Result Value Ref Range    HGB 8.1 (L) 13.0 - 16.0 g/dL    HCT 25.4 (L) 36.0 - 48.0 %     Additional Data Reviewed:     Condition: Stable  Follow-up Appointments:   PCP in 5-7 days Dr. Mariah Tyler in 2-3 days Dr. Veronique Holland in 1-2 weeks Spine surgery as scheduled on 06/14    >30 minutes spent coordinating this discharge (review instructions/follow-up, prescriptions, preparing report for sign off)    Signed:  Luis Alberto Comer NP  6/13/2021  11:22 AM

## 2021-06-13 NOTE — PROGRESS NOTES
Problem: Falls - Risk of  Goal: *Absence of Falls  Description: Document [place] Fall Risk and appropriate interventions in the flowsheet.   Outcome: Progressing Towards Goal  Note: Fall Risk Interventions:            Medication Interventions: Bed/chair exit alarm    Elimination Interventions: Call light in reach              Problem: Patient Education: Go to Patient Education Activity  Goal: Patient/Family Education  Outcome: Progressing Towards Goal     Problem: Anemia Care Plan (Adult and Pediatric)  Goal: *Labs within defined limits  Outcome: Progressing Towards Goal  Goal: *Tolerates increased activity  Outcome: Progressing Towards Goal     Problem: Patient Education: Go to Patient Education Activity  Goal: Patient/Family Education  Outcome: Progressing Towards Goal     Problem: Patient Education: Go to Patient Education Activity  Goal: Patient/Family Education  Outcome: Progressing Towards Goal     Problem: Heart Failure: Day 1  Goal: Off Pathway (Use only if patient is Off Pathway)  Outcome: Progressing Towards Goal  Goal: Activity/Safety  Outcome: Progressing Towards Goal  Goal: Consults, if ordered  Outcome: Progressing Towards Goal  Goal: Diagnostic Test/Procedures  Outcome: Progressing Towards Goal  Goal: Nutrition/Diet  Outcome: Progressing Towards Goal  Goal: Discharge Planning  Outcome: Progressing Towards Goal  Goal: Medications  Outcome: Progressing Towards Goal  Goal: Respiratory  Outcome: Progressing Towards Goal  Goal: Treatments/Interventions/Procedures  Outcome: Progressing Towards Goal  Goal: Psychosocial  Outcome: Progressing Towards Goal  Goal: *Oxygen saturation within defined limits  Outcome: Progressing Towards Goal  Goal: *Hemodynamically stable  Outcome: Progressing Towards Goal  Goal: *Optimal pain control at patient's stated goal  Outcome: Progressing Towards Goal  Goal: *Anxiety reduced or absent  Outcome: Progressing Towards Goal     Problem: Heart Failure: Day 2  Goal: Off Pathway (Use only if patient is Off Pathway)  Outcome: Progressing Towards Goal  Goal: Activity/Safety  Outcome: Progressing Towards Goal  Goal: Consults, if ordered  Outcome: Progressing Towards Goal  Goal: Diagnostic Test/Procedures  Outcome: Progressing Towards Goal  Goal: Nutrition/Diet  Outcome: Progressing Towards Goal  Goal: Discharge Planning  Outcome: Progressing Towards Goal  Goal: Medications  Outcome: Progressing Towards Goal  Goal: Respiratory  Outcome: Progressing Towards Goal  Goal: Treatments/Interventions/Procedures  Outcome: Progressing Towards Goal  Goal: Psychosocial  Outcome: Progressing Towards Goal  Goal: *Oxygen saturation within defined limits  Outcome: Progressing Towards Goal  Goal: *Hemodynamically stable  Outcome: Progressing Towards Goal  Goal: *Optimal pain control at patient's stated goal  Outcome: Progressing Towards Goal  Goal: *Anxiety reduced or absent  Outcome: Progressing Towards Goal  Goal: *Demonstrates progressive activity  Outcome: Progressing Towards Goal     Problem: Heart Failure: Day 3  Goal: Off Pathway (Use only if patient is Off Pathway)  Outcome: Progressing Towards Goal  Goal: Activity/Safety  Outcome: Progressing Towards Goal  Goal: Diagnostic Test/Procedures  Outcome: Progressing Towards Goal  Goal: Nutrition/Diet  Outcome: Progressing Towards Goal  Goal: Discharge Planning  Outcome: Progressing Towards Goal  Goal: Medications  Outcome: Progressing Towards Goal  Goal: Respiratory  Outcome: Progressing Towards Goal  Goal: Treatments/Interventions/Procedures  Outcome: Progressing Towards Goal  Goal: Psychosocial  Outcome: Progressing Towards Goal  Goal: *Oxygen saturation within defined limits  Outcome: Progressing Towards Goal  Goal: *Hemodynamically stable  Outcome: Progressing Towards Goal  Goal: *Optimal pain control at patient's stated goal  Outcome: Progressing Towards Goal  Goal: *Anxiety reduced or absent  Outcome: Progressing Towards Goal  Goal: *Demonstrates progressive activity  Outcome: Progressing Towards Goal     Problem: Heart Failure: Day 4  Goal: Off Pathway (Use only if patient is Off Pathway)  Outcome: Progressing Towards Goal  Goal: Activity/Safety  Outcome: Progressing Towards Goal  Goal: Diagnostic Test/Procedures  Outcome: Progressing Towards Goal  Goal: Nutrition/Diet  Outcome: Progressing Towards Goal  Goal: Discharge Planning  Outcome: Progressing Towards Goal  Goal: Medications  Outcome: Progressing Towards Goal  Goal: Respiratory  Outcome: Progressing Towards Goal  Goal: Treatments/Interventions/Procedures  Outcome: Progressing Towards Goal  Goal: Psychosocial  Outcome: Progressing Towards Goal  Goal: *Oxygen saturation within defined limits  Outcome: Progressing Towards Goal  Goal: *Hemodynamically stable  Outcome: Progressing Towards Goal  Goal: *Optimal pain control at patient's stated goal  Outcome: Progressing Towards Goal  Goal: *Anxiety reduced or absent  Outcome: Progressing Towards Goal  Goal: *Demonstrates progressive activity  Outcome: Progressing Towards Goal     Problem: Heart Failure: Day 5  Goal: Off Pathway (Use only if patient is Off Pathway)  Outcome: Progressing Towards Goal  Goal: Activity/Safety  Outcome: Progressing Towards Goal  Goal: Diagnostic Test/Procedures  Outcome: Progressing Towards Goal  Goal: Nutrition/Diet  Outcome: Progressing Towards Goal  Goal: Discharge Planning  Outcome: Progressing Towards Goal  Goal: Medications  Outcome: Progressing Towards Goal  Goal: Respiratory  Outcome: Progressing Towards Goal  Goal: Treatments/Interventions/Procedures  Outcome: Progressing Towards Goal  Goal: Psychosocial  Outcome: Progressing Towards Goal     Problem: Heart Failure: Discharge Outcomes  Goal: *Demonstrates ability to perform prescribed activity without shortness of breath or discomfort  Outcome: Progressing Towards Goal  Goal: *Left ventricular function assessment completed prior to or during stay, or planned for post-discharge  Outcome: Progressing Towards Goal  Goal: *ACEI prescribed if LVEF less than 40% and no contraindications or ARB prescribed  Outcome: Progressing Towards Goal  Goal: *Verbalizes understanding and describes prescribed diet  Outcome: Progressing Towards Goal  Goal: *Verbalizes understanding/describes prescribed medications  Outcome: Progressing Towards Goal  Goal: *Describes available resources and support systems  Description: (eg: Home Health, Palliative Care, Advanced Medical Directive)  Outcome: Progressing Towards Goal  Goal: *Describes smoking cessation resources  Outcome: Progressing Towards Goal  Goal: *Understands and describes signs and symptoms to report to providers(Stroke Metric)  Outcome: Progressing Towards Goal  Goal: *Describes/verbalizes understanding of follow-up/return appt  Description: (eg: to physicians, diabetes treatment coordinator, and other resources  Outcome: Progressing Towards Goal  Goal: *Describes importance of continuing daily weights and changes to report to physician  Outcome: Progressing Towards Goal

## 2021-06-13 NOTE — DISCHARGE INSTRUCTIONS
Discharge Instructions    Patient: Etta Sanchez MRN: [id number]  CSN: [id number]    YOB: 1960  Age: 56 y.o.   Sex: male    DOA: 6/11/2021 LOS:  LOS: 2 days   Discharge Date:      DIET:  Cardiac Diet    ACTIVITY: Activity as tolerated    ADDITIONAL INFORMATION: If you experience any of the following symptoms but not limited to Fever, chills, nausea, vomiting, diarrhea, change in mentation, falling, bleeding, shortness of breath, chest pain, please call your primary care physician or return to the emergency room if you cannot get hold of your doctor:     FOLLOW UP CARE:  PCP in 5-7 days Dr. Shalom Jeffers in 2-3 days Dr. Trav Jung in 1-2 weeks Spine surgery as scheduled on 06/14    Levi Bearden NP  6/13/2021 11:21 AM

## 2021-06-18 PROBLEM — R22.41 HIP MASS, RIGHT: Status: ACTIVE | Noted: 2021-01-01

## 2021-06-18 PROBLEM — M25.551 RIGHT HIP PAIN: Status: ACTIVE | Noted: 2021-01-01

## 2021-06-18 NOTE — ED TRIAGE NOTES
Patient presents to the ED with body pain (all over), R hip pain onset yesterday, denies recent injury/traum to the hip, Patient appears to have difficulty with med Hx

## 2021-06-18 NOTE — Clinical Note
Status[de-identified] INPATIENT [101]   Type of Bed: Telemetry [19]   Cardiac Monitoring Required?: No   Inpatient Hospitalization Certified Necessary for the Following Reasons: 3.  Patient receiving treatment that can only be provided in an inpatient setting (further clarification in H&P documentation)   Admitting Diagnosis: Right hip pain [9047154]   Admitting Diagnosis: Hip mass, right [8392570]   Admitting Physician: Joya Libman [[id number]]   Attending Physician: Joya Libman [[id number]]   Estimated Length of Stay: 2 Midnights   Discharge Plan[de-identified] Home with Office Follow-up
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
## 2021-06-18 NOTE — ED NOTES
Patient sleeping, had to be awakened to ask about pain, states the pain has gone down, unable to give number, wife and daughter at the bedsdie

## 2021-06-18 NOTE — PROGRESS NOTES
MRI Safety Screening form needs to be filled out and FAXED to [phone] BEFORE MRI can be scheduled. If unable to acquire information from patient, [facility] must be contacted, or screening xrays will need to be ordred.     If pt is Claustrophobic or needs Pain Meds, please have these ordered in advance to help facilitate MRI exam.

## 2021-06-18 NOTE — CONSULTS
Interventional Radiology    Consulted to evaluate for possible drainage of right hip. There is no distinct fluid collection amenable to aspiration or drainage. IR will remain available as needed. We appreciate the consultation.     Thank you,  Astrid Morton, [address]

## 2021-06-21 NOTE — ADT AUTH CERT NOTES
PREVIOUSLY DENIED FOR INPATIENT DOWNGRADED TO OBSERVATION REF #[id number]   DATES OF SERVICE 6/11-6/13 PLEASE FAX FORM OR CALL BACK TO NOTIFY IF  AUTHORIZATION FOR OBSERVATION IS OR IS NOT REQUIRED  PHONE # [phone] FAX # [phone]

## 2021-06-29 NOTE — PROGRESS NOTES
Physician Progress Note      PATIENTLarwerich Leblanc  CSN #:                  [id number]  :                       1960  ADMIT DATE:       2021 7:17 AM  DISCH DATE:        2021 7:35 PM  RESPONDING  PROVIDER #:        Sara Mckenzie MD          QUERY TEXT:      Dear Dr Luis Antonio Hill    Pt admitted with right hip pain . Pt noted to have malignant lung  cancer with  known  bony mets . If possible, please document in progress notes and discharge summary the relationship, if any, between  right hip pain  and lung cancer . Jaylen Katz The medical record reflects the following:    Risk Factors: past  h/o lung cancer    Clinical Indicators: known  mets to bone -   CT pelvis-   Large lytic lesion arising from the right iliac wing measuring up to about  11.0 x 9.2 cm (axial #47). Notably, this lytic lesion was about 3.7 x 2.0 cm on  21. Treatment: - pain control , home with hospice    Thank you,   Vivien Singh RN   CCDS  x   [phone]  Options provided:  -- right hip pain  due to secondary   malignancy of bone  -- right hip pain unrelated to secondary   malignancy of bone  -- Other - I will add my own diagnosis  -- Disagree - Not applicable / Not valid  -- Disagree - Clinically unable to determine / Unknown  -- Refer to Clinical Documentation Reviewer    PROVIDER RESPONSE TEXT:    This patient has right hip pain due to secondary malignancy of bone .     Query created by: Rene Restrepo on 2021 6:15 AM      Electronically signed by:  Sara Mckenzie MD 2021 1:26 PM

## 2021-06-29 NOTE — HOSPICE
The following standard precautions for infection control were utilized:  Mask   provided pastoral care and support.  provided listening presence and spiritual support. Patient and  prayed together. Family was tearful but have told patient that its ok.  encouraged the family to continue to make memories. (self-care, coping strategies, reflection, etc.). Luigi Santo will continue to provide support as scheduled to patient and caregiver/family.

## 2021-07-03 VITALS
OXYGEN SATURATION: 93 % | SYSTOLIC BLOOD PRESSURE: 124 MMHG | SYSTOLIC BLOOD PRESSURE: 98 MMHG | DIASTOLIC BLOOD PRESSURE: 62 MMHG | HEART RATE: 106 BPM | HEART RATE: 122 BPM | DIASTOLIC BLOOD PRESSURE: 84 MMHG | RESPIRATION RATE: 25 BRPM

## 2021-07-05 VITALS
OXYGEN SATURATION: 85 % | RESPIRATION RATE: 24 BRPM | SYSTOLIC BLOOD PRESSURE: 108 MMHG | DIASTOLIC BLOOD PRESSURE: 58 MMHG | HEART RATE: 118 BPM | TEMPERATURE: 97.6 F

## 2023-07-25 NOTE — ADT AUTH CERT NOTES
[address] [place] [address]  
  
FACILITY NPI : [id number] FACILITY TAX ID :  
  
[address] [place] [address] [facility] [address] 
[address] [place] [id number] [phone]  DEPT CONTACT: 
Hien Naidu 
[id number] [facility]#[phone] 
  
  
   
Patient Name :Teresa Martinez  : 1960 (60 yrs) MRN : [id number] 
  
Patient Mailing Address [address] [place] [address] [47] , [id number]        
  
  . 
  
   
Insurance Plan Payor: BLUE CROSS / Plan: [address] [place] [address] / Product Type: PPO /  
  
Primary Coverage Subscriber ID : [id number] 
  
Secondary Coverage:  N/A 
  
Secondary Subscriber ID :   
   
Current Patient Class : INPATIENT Admit Date : 2021 
  
REQUESTED LEVEL OF CARE: INPATIENT [101] Diagnosis : Hemolytic anemia (HCC);COPD exacerbation (HCC) 
                    
  
ICD10 Code : Hemolytic anemia ([facility] [D58.9] COPD exacerbation ([facility] [J44.1]   
Current Room and Bed 467/ 
  
Admitting and Attending Info: 
Admitting Provider : Zahira Chaves MD   NPI: [id number] Admitting Provider Phone. [phone] Admitting Provider Address: [address] [facility] 
   
 
 
 Azithromycin Pregnancy And Lactation Text: This medication is considered safe during pregnancy and is also secreted in breast milk.

## 2024-04-18 NOTE — PATIENT INSTRUCTIONS
Health Maintenance Due   Topic Date Due    Hepatitis C Test  1960    Pneumococcal Vaccine (1 of 1 - PPSV23) 07/01/1977    Stool testing for trace blood  07/01/2008    DTaP/Tdap/Td  (1 - Tdap) 08/30/2008    Flu Vaccine  08/01/2016
RIght TKR

## (undated) DEVICE — MASK SURG REG ORNG LEV 3 SFTY SEAL 4 LAYR SFT INNR LINING

## (undated) DEVICE — SINGLE USE BIOPSY VALVE MAJ-210: Brand: SINGLE USE BIOPSY VALVE (STERILE)

## (undated) DEVICE — CONTAINER PREFIL FRMLN 40ML --

## (undated) DEVICE — NDL BX EXCELON 21GX130CM --

## (undated) DEVICE — BITE BLOCK ENDOSCP UNIV AD 6 TO 9.4 MM

## (undated) DEVICE — REAG CYTO FIX 4OZ PMP SPRY --

## (undated) DEVICE — 1860S HEALTH CARE RESPIRATOR N95 120EA/C: Brand: 3M™

## (undated) DEVICE — CATHETER SUCT TR FL TIP 14FR W/ O CTRL

## (undated) DEVICE — SYR 10ML SLIP TIP 1/5ML GRAD --

## (undated) DEVICE — FCPS BIOP PULM RAD JAW 100CML -- BX/10 M00515180

## (undated) DEVICE — DRAPE TWL SURG 16X26IN BLU ORB04] ALLCARE INC]

## (undated) DEVICE — NEEDLE ASPIR 22GA L700MM US GUID TREAT DST END FOR

## (undated) DEVICE — GOWN ISOL IMPERV UNIV, DISP, OPEN BACK, BLUE --

## (undated) DEVICE — MAILER SLDE MICSCP 2 PLC --

## (undated) DEVICE — SLIDE MICRO PLAIN PRECLEAND --

## (undated) DEVICE — AIRLIFE™ ADULT OXYGEN MASK VINYL, UNDER-THE-CHIN STYLE, MEDIUM CONCENTRATION MASK WITH 7 FEET (2.1 M) CRUSH-RESISTANT OXYGEN TUBING: Brand: AIRLIFE™

## (undated) DEVICE — SINGLE-USE BIOPSY FORCEPS: Brand: RADIAL JAW 4

## (undated) DEVICE — BRUSH CYTO BRIST DIA1MM SHTH L140CM CHN 2MM PULM BRONCHSCP

## (undated) DEVICE — STERILE POLYISOPRENE POWDER-FREE SURGICAL GLOVES: Brand: PROTEXIS

## (undated) DEVICE — BASIN EMESIS 500CC ROSE 250/CS 60/PLT: Brand: MEDEGEN MEDICAL PRODUCTS, LLC

## (undated) DEVICE — FLEX ADVANTAGE 3000CC: Brand: FLEX ADVANTAGE

## (undated) DEVICE — BRUSH CYTO BRONCHSCP 1.5/140MM -- CELLEBRITY

## (undated) DEVICE — WANG TRANSBRONCHIAL HISTOLOGY NEEDLE FOR CENTRAL REGION, 1.9 MM X 130 CM: Brand: WANG

## (undated) DEVICE — BE 105-8 BRONCHOSCOPE SWIVEL - 15MM ID/22MM OD (PATIENT PORT) X15MM OD (EQUIPMENT PORT). REUSABLE.  FITS COMPONENTS OF ADULT VENTILATOR CIRCUITS.  MOLDED OF POLYETHERIMIDE. INCLUDES TWO SILICONE RUBBER CAPS; ONE CAP ALLOWS FOR THE USE OF A SUCTIONING CATHETER WHILE THE OTHER CAP ALLOWS FOR THE USE OF A FIBER-OPTIC BRONCHOSCOPE WITHOUT SIGNIFICANT LOSS OF PEEP.: Brand: BE 105-8 BRONCHOSCOPE SWIVEL

## (undated) DEVICE — MEDI-VAC NON-CONDUCTIVE SUCTION TUBING: Brand: CARDINAL HEALTH

## (undated) DEVICE — SLEEVE COMPR STD 12 IN FOR 165IN CALF COMFORT VENODYNE SYS

## (undated) DEVICE — AIRLIFE™ NASAL OXYGEN CANNULA CURVED, FLARED TIP WITH 14 FOOT (4.3 M) CRUSH-RESISTANT TUBING, OVER-THE-EAR STYLE: Brand: AIRLIFE™

## (undated) DEVICE — BLADE, TONGUE, 6", STERILE: Brand: MEDLINE

## (undated) DEVICE — 1860 HEALTH CARE N95 MASK, 20EACH/BOX  6 BX/C: Brand: 3M™

## (undated) DEVICE — SINGLE USE SUCTION VALVE MAJ-209: Brand: SINGLE USE SUCTION VALVE (STERILE)

## (undated) DEVICE — APPLICATOR FBR TIP L6IN COT TIP WOOD SHFT SWAB 2000 PER CA

## (undated) DEVICE — AIRLIFE™ MISTY MAX 10™ NEBULIZER WITH 7 FOOT (2.1 M) CRUSH RESISTANT OXYGEN TUBING, BAFFLED TEE ADAPTER (22 MM I.D./22 MM O.D.), MOUTHPIECE AND 6 INCH (15 CM) FLEXTUBE: Brand: AIRLIFE™

## (undated) DEVICE — SET ADMIN L104IN 20 GTT GRAV RLER CLMP SMRT SITE NDL FREE

## (undated) DEVICE — MEDI-VAC SUCTION HIGH CAPACITY: Brand: CARDINAL HEALTH